# Patient Record
Sex: FEMALE | Race: WHITE | NOT HISPANIC OR LATINO | Employment: FULL TIME | URBAN - METROPOLITAN AREA
[De-identification: names, ages, dates, MRNs, and addresses within clinical notes are randomized per-mention and may not be internally consistent; named-entity substitution may affect disease eponyms.]

---
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
## 2019-04-26 ENCOUNTER — OFFICE VISIT (OUTPATIENT)
Dept: FAMILY MEDICINE | Facility: CLINIC | Age: 19
End: 2019-04-26
Payer: COMMERCIAL

## 2019-04-26 VITALS
HEART RATE: 88 BPM | TEMPERATURE: 98.2 F | DIASTOLIC BLOOD PRESSURE: 63 MMHG | RESPIRATION RATE: 16 BRPM | OXYGEN SATURATION: 98 % | BODY MASS INDEX: 30.02 KG/M2 | WEIGHT: 159 LBS | SYSTOLIC BLOOD PRESSURE: 125 MMHG | HEIGHT: 61 IN

## 2019-04-26 DIAGNOSIS — H69.91 DYSFUNCTION OF RIGHT EUSTACHIAN TUBE: Primary | ICD-10-CM

## 2019-04-26 DIAGNOSIS — L70.0 ACNE VULGARIS: ICD-10-CM

## 2019-04-26 PROCEDURE — 99213 OFFICE O/P EST LOW 20 MIN: CPT | Performed by: FAMILY MEDICINE

## 2019-04-26 RX ORDER — CEFUROXIME AXETIL 500 MG/1
500 TABLET ORAL 2 TIMES DAILY
Qty: 20 TABLET | Refills: 0 | Status: SHIPPED | OUTPATIENT
Start: 2019-04-26 | End: 2019-11-01

## 2019-04-26 ASSESSMENT — MIFFLIN-ST. JEOR: SCORE: 1428.6

## 2019-04-26 NOTE — PROGRESS NOTES
"SUBJECTIVE:  Sofy Stringer, a 19 year old female scheduled an appointment to discuss the following issues:     Acne vulgaris  Neoplasm of uncertain behavior of skin  right ear pain. No discharge. No swimming/ no airplance. Uses q-tips.   Pain past 2 weeks. No plugged. History sinus infections but doesn't feel like it. No fevers or chills.   No rhinorrhea/cough. left ear ok. History ALLERGIC RHINITIS - allegra.   Hearing a little down. Worse in AM. No jaw pain or teeth pain. Dentist  2 weeks ago.   omnicef ok in past.  Medical, social, surgical, and family histories reviewed.    ROS:  All other ROS negative.     OBJECTIVE:  /63   Pulse 88   Temp 98.2  F (36.8  C) (Oral)   Resp 16   Ht 1.549 m (5' 1\")   Wt 72.1 kg (159 lb)   SpO2 98%   BMI 30.04 kg/m    EXAM:  GENERAL APPEARANCE: healthy, alert and no distress  EYES: EOMI,  PERRL  HENT: ear canals and TM's right retracted with clear fliud behind tm and nose thick discharge and mouth without ulcers or lesions  NECK: no adenopathy, no asymmetry, masses, or scars and thyroid normal to palpation  RESP: lungs clear to auscultation - no rales, rhonchi or wheezes  CV: regular rates and rhythm, normal S1 S2, no S3 or S4 and no murmur, click or rub -  SKIN:mild acne on face  PSYCH: mentation appears normal and affect normal/bright    ASSESSMENT / PLAN:  (H69.81) Dysfunction of right eustachian tube  (primary encounter diagnosis)  Plan: cefuroxime (CEFTIN) 500 MG tablet        Continue allegra and add prn sudafed. Ear clearing exercises and consider flonase. ENT if worse/persists. Expected course and warning signs reviewed. Call/email with questions/concerns. Reveiwed risks and side effects of medication      (L70.0) Acne vulgaris  Comment: stable  Plan: benzoyl peroxide 5 % external liquid        Prn.     Tom Quarles MD          "

## 2019-04-26 NOTE — NURSING NOTE
"Chief Complaint   Patient presents with     Ear Problem     right        Initial /63   Pulse 88   Temp 98.2  F (36.8  C) (Oral)   Resp 16   Ht 1.549 m (5' 1\")   Wt 72.1 kg (159 lb)   SpO2 98%   BMI 30.04 kg/m   Estimated body mass index is 30.04 kg/m  as calculated from the following:    Height as of this encounter: 1.549 m (5' 1\").    Weight as of this encounter: 72.1 kg (159 lb).    Lakeisha Donnelly CMA    "

## 2019-09-23 DIAGNOSIS — L70.0 ACNE VULGARIS: ICD-10-CM

## 2019-11-01 ENCOUNTER — OFFICE VISIT (OUTPATIENT)
Dept: FAMILY MEDICINE | Facility: CLINIC | Age: 19
End: 2019-11-01
Payer: COMMERCIAL

## 2019-11-01 VITALS
BODY MASS INDEX: 28.32 KG/M2 | OXYGEN SATURATION: 100 % | WEIGHT: 150 LBS | TEMPERATURE: 98.5 F | HEART RATE: 68 BPM | SYSTOLIC BLOOD PRESSURE: 129 MMHG | HEIGHT: 61 IN | DIASTOLIC BLOOD PRESSURE: 88 MMHG

## 2019-11-01 DIAGNOSIS — J20.9 ACUTE BRONCHITIS WITH SYMPTOMS > 10 DAYS: Primary | ICD-10-CM

## 2019-11-01 DIAGNOSIS — N92.6 IRREGULAR MENSTRUAL CYCLE: ICD-10-CM

## 2019-11-01 PROCEDURE — 99214 OFFICE O/P EST MOD 30 MIN: CPT | Performed by: PHYSICIAN ASSISTANT

## 2019-11-01 RX ORDER — BENZONATATE 200 MG/1
200 CAPSULE ORAL 3 TIMES DAILY PRN
Qty: 30 CAPSULE | Refills: 0 | Status: SHIPPED | OUTPATIENT
Start: 2019-11-01 | End: 2019-11-11

## 2019-11-01 RX ORDER — NORETHINDRONE ACETATE AND ETHINYL ESTRADIOL 1MG-20(21)
1 KIT ORAL DAILY
Qty: 90 TABLET | Refills: 0 | Status: SHIPPED | OUTPATIENT
Start: 2019-11-01 | End: 2019-11-11 | Stop reason: ALTCHOICE

## 2019-11-01 RX ORDER — AZITHROMYCIN 250 MG/1
TABLET, FILM COATED ORAL
Qty: 6 TABLET | Refills: 0 | Status: SHIPPED | OUTPATIENT
Start: 2019-11-01 | End: 2019-11-11

## 2019-11-01 ASSESSMENT — ENCOUNTER SYMPTOMS
ABDOMINAL PAIN: 0
FREQUENCY: 0
COUGH: 1
SHORTNESS OF BREATH: 0
VOMITING: 0
FEVER: 0
RHINORRHEA: 1
DIARRHEA: 0
CHEST TIGHTNESS: 0
WHEEZING: 0
HEMATOCHEZIA: 0
SINUS PAIN: 1
CARDIOVASCULAR NEGATIVE: 1
GASTROINTESTINAL NEGATIVE: 1
NAUSEA: 0
SORE THROAT: 0
PALPITATIONS: 0
CONSTITUTIONAL NEGATIVE: 1
CHILLS: 0
HEMATURIA: 0
DYSURIA: 0
FLANK PAIN: 0
SINUS PRESSURE: 1
CONSTIPATION: 0
HEARTBURN: 0
FATIGUE: 0

## 2019-11-01 ASSESSMENT — MIFFLIN-ST. JEOR: SCORE: 1387.78

## 2019-11-01 NOTE — PROGRESS NOTES
Subjective   Sofy Stringer is a 19 year old female who presents to clinic today for the following health issues:  HPI   ENT Symptoms           Symptoms: cc Present Absent Comment   Fever/Chills   x    Fatigue   x    Muscle Aches   x    Eye Irritation   x    Sneezing  x     Nasal Jp/Drg  x     Sinus Pressure/Pain  x  Slight    Loss of smell  x     Dental pain   x    Sore Throat   x    Swollen Glands   x    Ear Pain/Fullness  x     Cough  x  Productive but no hemoptysis   Wheeze   x    Chest Pain   x    Shortness of breath   x    Rash   x    Other         Symptom duration:  2 weeks    Symptom severity:     Treatments tried:  dayquil and nightquil, rest    Contacts:  school         2)  Had recently restarted on her birth control pills, suha, 3weeks ago and her period has been continuous since then, non-stop.  No vaginal d/c, rashes and irritation.  She is on birth control to help regulate her menses.  She is not sexually active.  No previous hx of HTN, DVT, liver disease, cervical or uterine cancer.  Non-smoker.          Patient Active Problem List   Diagnosis     Obesity     Acne     Past Surgical History:   Procedure Laterality Date     TONSILLECTOMY & ADENOIDECTOMY  2002       Social History     Tobacco Use     Smoking status: Never Smoker     Smokeless tobacco: Never Used     Tobacco comment: Lives in smoke free household   Substance Use Topics     Alcohol use: No     Family History   Problem Relation Age of Onset     Thyroid Disease Father      Hypertension Maternal Grandmother      Lipids Maternal Grandmother      Skin Cancer Maternal Grandmother      Diabetes Maternal Grandmother      Hyperlipidemia Maternal Grandmother      Cancer - colorectal Maternal Grandfather      Cancer Maternal Grandfather      Colon Cancer Maternal Grandfather      Asthma Maternal Grandfather      Cerebrovascular Disease Other      Glaucoma No family hx of      Macular Degeneration No family hx of          Current Outpatient  "Medications   Medication Sig Dispense Refill     benzoyl peroxide 5 % external liquid Wash face daily 226 g 5     Bioflavonoid Products (VITAMIN C) CHEW Take 2 chew tab by mouth daily       Iron Combinations (IRON COMPLEX) CAPS Take 1 capsule by mouth daily       MULTI VIT/FL OR None Entered       Probiotic Product (PRO-BIOTIC BLEND) CAPS Take 1 capsule by mouth daily Reported on 5/11/2017       Allergies   Allergen Reactions     Penicillins Rash       Reviewed and updated as needed this visit by Provider       Review of Systems   Constitutional: Negative.  Negative for chills, fatigue and fever.   HENT: Positive for congestion, rhinorrhea, sinus pressure and sinus pain. Negative for ear discharge, ear pain, hearing loss and sore throat.    Respiratory: Positive for cough. Negative for chest tightness, shortness of breath and wheezing.    Cardiovascular: Negative.  Negative for chest pain, palpitations and peripheral edema.   Gastrointestinal: Negative.  Negative for abdominal pain, constipation, diarrhea, heartburn, hematochezia, nausea and vomiting.   Genitourinary: Positive for vaginal bleeding. Negative for dysuria, flank pain, frequency, hematuria, pelvic pain, urgency and vaginal discharge.   All other systems reviewed and are negative.         Objective    /88   Pulse 68   Temp 98.5  F (36.9  C) (Oral)   Ht 1.549 m (5' 1\")   Wt 68 kg (150 lb)   SpO2 100%   BMI 28.34 kg/m    Body mass index is 28.34 kg/m .  Physical Exam  Vitals signs and nursing note reviewed.   Constitutional:       General: She is not in acute distress.     Appearance: Normal appearance. She is well-developed and normal weight. She is not ill-appearing.   HENT:      Head: Normocephalic and atraumatic.      Comments: TMs are intact without any erythema or bulging bilaterally.  Airway is patent.     Nose: Nose normal.      Mouth/Throat:      Mouth: Mucous membranes are moist.      Pharynx: Uvula midline. No pharyngeal swelling, " oropharyngeal exudate or posterior oropharyngeal erythema.      Tonsils: No tonsillar exudate.   Eyes:      General: No scleral icterus.     Extraocular Movements: Extraocular movements intact.      Conjunctiva/sclera: Conjunctivae normal.      Pupils: Pupils are equal, round, and reactive to light.   Neck:      Musculoskeletal: Normal range of motion and neck supple.      Thyroid: No thyromegaly.   Cardiovascular:      Rate and Rhythm: Normal rate and regular rhythm.      Pulses: Normal pulses.      Heart sounds: Normal heart sounds, S1 normal and S2 normal. No murmur. No friction rub. No gallop.    Pulmonary:      Effort: Pulmonary effort is normal. No accessory muscle usage, respiratory distress or retractions.      Breath sounds: Normal breath sounds and air entry. No stridor. No decreased breath sounds, wheezing, rhonchi or rales.   Lymphadenopathy:      Cervical: No cervical adenopathy.   Skin:     General: Skin is warm and dry.      Nails: There is no clubbing.     Neurological:      Mental Status: She is alert and oriented to person, place, and time.   Psychiatric:         Mood and Affect: Mood normal.         Behavior: Behavior normal.         Thought Content: Thought content normal.         Judgement: Judgment normal.              Assessment & Plan   Acute bronchitis with symptoms > 10 days:  Will treat with zithromax X5days and tessalon perles as needed for cough  Recommend treatment with rest, fluids and chicken soup. Tylenol/ibuprofen prn fever/pain.  Recheck in clinic if symptoms worsen or if symptoms do not improve.  To the ER if he develops hemoptysis, chest pain, fevers>102, worsening shortness of breath/wheezing.    -     azithromycin (ZITHROMAX) 250 MG tablet; 2 tablets the first day, then 1 tablet daily for the next 4 days  -     benzonatate (TESSALON) 200 MG capsule; Take 1 capsule (200 mg) by mouth 3 times daily as needed for cough    Irregular menstrual cycle:  Most likely due to restarting  her birth control pills.  Will switch birth control pills to low estrogen, low progesterone dose.  Discussed risks and benefits of medication along with side effects, direction for use/missed pills, and backup method.  Recheck in clinic if symptoms worsen or if symptoms do not improve.  -     norethindrone-ethinyl estradiol (LOESTRIN FE 1/20) 1-20 MG-MCG tablet; Take 1 tablet by mouth daily           Nadya See EDILIA George  [facility]

## 2019-11-07 ENCOUNTER — HEALTH MAINTENANCE LETTER (OUTPATIENT)
Age: 19
End: 2019-11-07

## 2019-11-11 ENCOUNTER — OFFICE VISIT (OUTPATIENT)
Dept: OBGYN | Facility: CLINIC | Age: 19
End: 2019-11-11
Payer: COMMERCIAL

## 2019-11-11 VITALS
HEART RATE: 93 BPM | WEIGHT: 151 LBS | SYSTOLIC BLOOD PRESSURE: 132 MMHG | OXYGEN SATURATION: 99 % | BODY MASS INDEX: 28.51 KG/M2 | TEMPERATURE: 98 F | HEIGHT: 61 IN | DIASTOLIC BLOOD PRESSURE: 83 MMHG

## 2019-11-11 DIAGNOSIS — N92.6 IRREGULAR MENSTRUAL CYCLE: Primary | ICD-10-CM

## 2019-11-11 PROCEDURE — 99203 OFFICE O/P NEW LOW 30 MIN: CPT | Performed by: NURSE PRACTITIONER

## 2019-11-11 RX ORDER — NORGESTIMATE AND ETHINYL ESTRADIOL 0.25-0.035
1 KIT ORAL DAILY
Qty: 28 TABLET | Refills: 5 | Status: SHIPPED | OUTPATIENT
Start: 2019-11-11 | End: 2020-07-14

## 2019-11-11 ASSESSMENT — PAIN SCALES - GENERAL: PAINLEVEL: NO PAIN (0)

## 2019-11-11 ASSESSMENT — MIFFLIN-ST. JEOR: SCORE: 1384.37

## 2019-11-11 NOTE — PROGRESS NOTES
Subjective     Sofy Stringer is a 19 year old female who presents to clinic today for the following health issues:    HPI   Follow up Irregular menstrual cycle    Patient has a history of unpredictable menstrual cycles. When they occur, can be heavy and painful. In the past, has used combined oral contraceptive pills for cycle control ahead of travel. Patient leaving in January for a cruise and started her pills in October. Last, year, was started on Ree, but did not like how it only had 4 placebo days and felt it was shutting down her flow too early, then changed to Flor and did well on it.  Started Flor in October, had bleeding through the entire first 3 weeks of active pills. Was seen in same day visit for respiratory issues 11/1/19 (end of week 3) and mentioned the bleeding, switched to LoEstrin Fe 1/20. Did not take any pills 11/2/19 and then started new pills 11/3/19 and bleeding has become heavier. Never been sexually active, no missed or late pills. Denies fatigue, dizziness.     Patient Active Problem List   Diagnosis     Obesity     Acne     Past Surgical History:   Procedure Laterality Date     TONSILLECTOMY & ADENOIDECTOMY  2002       Social History     Tobacco Use     Smoking status: Never Smoker     Smokeless tobacco: Never Used     Tobacco comment: Lives in smoke free household   Substance Use Topics     Alcohol use: No     Family History   Problem Relation Age of Onset     Thyroid Disease Father      Hypertension Maternal Grandmother      Lipids Maternal Grandmother      Skin Cancer Maternal Grandmother      Diabetes Maternal Grandmother      Hyperlipidemia Maternal Grandmother      Cancer - colorectal Maternal Grandfather      Cancer Maternal Grandfather      Colon Cancer Maternal Grandfather      Asthma Maternal Grandfather      Cerebrovascular Disease Other      Glaucoma No family hx of      Macular Degeneration No family hx of            Reviewed and updated as needed this visit by  "Provider         Review of Systems   ROS COMP: Constitutional, HEENT, cardiovascular, pulmonary, gi and gu systems are negative, except as otherwise noted.      Objective    /83 (BP Location: Right arm, Patient Position: Sitting, Cuff Size: Adult Regular)   Pulse 93   Temp 98  F (36.7  C) (Oral)   Ht 1.537 m (5' 0.5\")   Wt 68.5 kg (151 lb)   SpO2 99%   BMI 29.00 kg/m    Body mass index is 29 kg/m .  Physical Exam   GENERAL: healthy, alert and no distress  MS: no gross musculoskeletal defects noted, no edema  SKIN: no suspicious lesions or rashes  NEURO: Normal strength and tone, mentation intact and speech normal  PSYCH: mentation appears normal, affect normal/bright    Assessment & Plan     1. Irregular menstrual cycle  We discussed her irregular cycles. She prefers not to remain on anything long term, only planning on staying on medication until after her cruise.  Discussed that irregular bleeding when first starting pills can be normal and possible that not taking a break when changing pills has caused it to continue. Patient not liking how she feels on the new pills, wants to try alternate.  Patient has taken a dose today. Will stop as of tomorrow, start new pills Sunday. Discussed she may have spotting to light flow the first pack, should resolve by second pack-if it does not, she should contact me. Patient is given an opportunity to ask questions and have them answered. Discussed initial side effects. Prescription sent with a few extra refills.  - norgestimate-ethinyl estradiol (ORTHO-CYCLEN/SPRINTEC) 0.25-35 MG-MCG tablet; Take 1 tablet by mouth daily  Dispense: 28 tablet; Refill: 5     BEN Carrasco [facility]      "

## 2019-11-12 ENCOUNTER — MYC MEDICAL ADVICE (OUTPATIENT)
Dept: FAMILY MEDICINE | Facility: CLINIC | Age: 19
End: 2019-11-12

## 2020-07-07 NOTE — PROGRESS NOTES
SUBJECTIVE:   CC: Sofy Stringer is an 20 year old woman who presents for preventive health visit.     Healthy Habits:    Forms for school, pt going to college. Needing labs and updated immunizations.    PAP is due per pt.      Do you get at least three servings of calcium containing foods daily (dairy, green leafy vegetables, etc.)? yes    Amount of exercise or daily activities, outside of work: 1 day(s) per week    Problems taking medications regularly No    Medication side effects: No    Have you had an eye exam in the past two years? yes    Do you see a dentist twice per year? yes    Do you have sleep apnea, excessive snoring or daytime drowsiness?no    Periods--heavy. Usually regular. H/o anemia will recheck. trouble taking iron tablets.     Pap-declined for now. Not sexually active never has been. Low risk. Will come back when comfortable per patient.     No drugs or alcohol abuse.      Is doing weight watchers to trying and lose weight. Obese.     Plans on doing kickboxing class.     Has nursing titers and forms today. Starts in fall.       Today's PHQ-2 Score:   PHQ-2 ( 1999 Pfizer) 7/14/2020 4/26/2019   Q1: Little interest or pleasure in doing things 0 0   Q2: Feeling down, depressed or hopeless 0 0   PHQ-2 Score 0 0       Abuse: Current or Past(Physical, Sexual or Emotional)- No  Do you feel safe in your environment? Yes        Social History     Tobacco Use     Smoking status: Never Smoker     Smokeless tobacco: Never Used     Tobacco comment: Lives in smoke free household   Substance Use Topics     Alcohol use: No     If you drink alcohol do you typically have >3 drinks per day or >7 drinks per week? No                     Reviewed orders with patient.  Reviewed health maintenance and updated orders accordingly - Yes  Lab work is in process  Labs reviewed in EPIC  BP Readings from Last 3 Encounters:   07/14/20 110/66   11/11/19 132/83   11/01/19 129/88    Wt Readings from Last 3 Encounters:    07/14/20 73 kg (161 lb)   11/11/19 68.5 kg (151 lb)   11/01/19 68 kg (150 lb)                  Patient Active Problem List   Diagnosis     Obesity     Acne     Past Surgical History:   Procedure Laterality Date     TONSILLECTOMY & ADENOIDECTOMY  2002       Social History     Tobacco Use     Smoking status: Never Smoker     Smokeless tobacco: Never Used     Tobacco comment: Lives in smoke free household   Substance Use Topics     Alcohol use: No     Family History   Problem Relation Age of Onset     Thyroid Disease Father      Hypertension Maternal Grandmother      Lipids Maternal Grandmother      Skin Cancer Maternal Grandmother      Diabetes Maternal Grandmother      Hyperlipidemia Maternal Grandmother      Cancer - colorectal Maternal Grandfather      Cancer Maternal Grandfather      Colon Cancer Maternal Grandfather      Asthma Maternal Grandfather      Cerebrovascular Disease Other      Glaucoma No family hx of      Macular Degeneration No family hx of          Current Outpatient Medications   Medication Sig Dispense Refill     benzoyl peroxide (BENZOYL PEROXIDE WASH) 5 % external liquid WASH FACE DAILY 227 g 5     Bioflavonoid Products (VITAMIN C) CHEW Take 2 chew tab by mouth daily       Iron Combinations (IRON COMPLEX) CAPS Take 1 capsule by mouth daily       MULTI VIT/FL OR None Entered       Probiotic Product (PRO-BIOTIC BLEND) CAPS Take 1 capsule by mouth daily Reported on 5/11/2017       Allergies   Allergen Reactions     Penicillins Rash       Mammogram not appropriate for this patient based on age.    Pertinent mammograms are reviewed under the imaging tab.  History of abnormal Pap smear: NO - age 21-29 PAP every 3 years recommended     Reviewed and updated as needed this visit by clinical staff  Tobacco  Allergies  Meds  Med Hx  Surg Hx  Fam Hx  Soc Hx        Reviewed and updated as needed this visit by Provider        History reviewed. No pertinent past medical history.   Past Surgical  "History:   Procedure Laterality Date     TONSILLECTOMY & ADENOIDECTOMY  2002     OB History    Para Term  AB Living   0 0 0 0 0 0   SAB TAB Ectopic Multiple Live Births   0 0 0 0 0       ROS:  CONSTITUTIONAL: NEGATIVE for fever, chills, change in weight  INTEGUMENTARU/SKIN: NEGATIVE for worrisome rashes, moles or lesions  EYES: NEGATIVE for vision changes or irritation  ENT: NEGATIVE for ear, mouth and throat problems  RESP: NEGATIVE for significant cough or SOB  BREAST: NEGATIVE for masses, tenderness or discharge  CV: NEGATIVE for chest pain, palpitations or peripheral edema  GI: NEGATIVE for nausea, abdominal pain, heartburn, or change in bowel habits  : NEGATIVE for unusual urinary or vaginal symptoms. Periods are regular.  MUSCULOSKELETAL: NEGATIVE for significant arthralgias or myalgia  NEURO: NEGATIVE for weakness, dizziness or paresthesias  PSYCHIATRIC: NEGATIVE for changes in mood or affect    OBJECTIVE:   /66   Pulse 79   Temp 98  F (36.7  C) (Tympanic)   Resp 14   Ht 1.537 m (5' 0.5\")   Wt 73 kg (161 lb)   LMP 2020 (Approximate)   SpO2 98%   Breastfeeding No   BMI 30.93 kg/m    EXAM:  GENERAL: alert, no distress and obese  EYES: Eyes grossly normal to inspection, PERRL and conjunctivae and sclerae normal  HENT: ear canals and TM's normal, nose and mouth without ulcers or lesions  NECK: no adenopathy, no asymmetry, masses, or scars and thyroid normal to palpation  RESP: lungs clear to auscultation - no rales, rhonchi or wheezes  BREAST: normal without masses, tenderness or nipple discharge and no palpable axillary masses or adenopathy  CV: regular rate and rhythm, normal S1 S2, no S3 or S4, no murmur, click or rub, no peripheral edema and peripheral pulses strong  ABDOMEN: soft, nontender, no hepatosplenomegaly, no masses and bowel sounds normal  MS: no gross musculoskeletal defects noted, no edema  SKIN: no suspicious lesions or rashes  NEURO: Normal strength and tone, " "mentation intact and speech normal  PSYCH: mentation appears normal, affect normal/bright    Diagnostic Test Results:  Labs reviewed in Epic    ASSESSMENT/PLAN:   1. Routine general medical examination at a health care facility    - Hepatitis B Surface Antibody  - Rubella Antibody IgG Quantitative  - Rubeola Antibody IgG  - Mumps Immune Status, IgG  - Varicella Zoster Virus Antibody IgG  - Quantiferon TB Gold Plus          4. Screening for diabetes mellitus    - Hemoglobin A1c    5. Anemia, unspecified type    - Hemoglobin  - Ferritin    COUNSELING:   Reviewed preventive health counseling, as reflected in patient instructions       Regular exercise       Healthy diet/nutrition       Vision screening       Hearing screening    Estimated body mass index is 30.93 kg/m  as calculated from the following:    Height as of this encounter: 1.537 m (5' 0.5\").    Weight as of this encounter: 73 kg (161 lb).    Weight management plan: Discussed healthy diet and exercise guidelines     reports that she has never smoked. She has never used smokeless tobacco.     Patient Instructions   Lifestyle recommendations:  Being overweight or obese puts you are risk of major health problems including but not limited to: heart disease/heart attack, stroke, high cholesterol, high blood pressure, and diabetes.  This is why it is important to be at a healthy weight for your height.     Exercise 30 minutes 3-5 times a week, if you can only do 10 minutes 3 times a week that is still shown to have great benefit!  Brisk walking even counts for this.  Consider free youtube videos for exercise that fits your needs and lifestyle.     Monitor your caffeine and soda intake, try to minimize these beverages    Drink plenty of water (about 70-80 ounces a day)    Try to eat a vegetable and fruit  with lunch and dinner.  Have a breakfast that contains protein such as eggs or oatmeal.  Decrease your white bread, pasta, and sweets intake.  Increase lean " proteins like chicken or pork. Try to eat out 1-2 times a week or less.  Monitor your portion sizes, try using smaller plates if needed.  Eat slowly, this gives you time to be aware that your body is full.   Let me know at any time if you would like a referral to a nutritionist!        Preventive Health Recommendations  Female Ages 21 to 25     Yearly exam:     See your health care provider every year in order to  o Review health changes.   o Discuss preventive care.    o Review your medicines if your doctor has prescribed any.      You should be tested each year for STDs (sexually transmitted diseases).       Talk to your provider about how often you should have cholesterol testing.      Get a Pap test every three years. If you have an abnormal result, your doctor may have you test more often.      If you are at risk for diabetes, you should have a diabetes test (fasting glucose).     Shots:     Get a flu shot each year.     Get a tetanus shot every 10 years.     Consider getting the shot (vaccine) that prevents cervical cancer (Gardasil).    Nutrition:     Eat at least 5 servings of fruits and vegetables each day.    Eat whole-grain bread, whole-wheat pasta and brown rice instead of white grains and rice.    Get adequate Calcium and Vitamin D.     Lifestyle    Exercise at least 150 minutes a week each week (30 minutes a day, 5 days a week). This will help you control your weight and prevent disease.    Limit alcohol to one drink per day.    No smoking.     Wear sunscreen to prevent skin cancer.    See your dentist every six months for an exam and cleaning.          Counseling Resources:  ATP IV Guidelines  Pooled Cohorts Equation Calculator  Breast Cancer Risk Calculator  FRAX Risk Assessment  ICSI Preventive Guidelines  Dietary Guidelines for Americans, 2010  USDA's MyPlate  ASA Prophylaxis  Lung CA Screening    Dary Pepper PA-C  [facility]

## 2020-07-14 ENCOUNTER — OFFICE VISIT (OUTPATIENT)
Dept: FAMILY MEDICINE | Facility: CLINIC | Age: 20
End: 2020-07-14
Payer: COMMERCIAL

## 2020-07-14 VITALS
HEART RATE: 79 BPM | OXYGEN SATURATION: 98 % | DIASTOLIC BLOOD PRESSURE: 66 MMHG | TEMPERATURE: 98 F | RESPIRATION RATE: 14 BRPM | HEIGHT: 61 IN | BODY MASS INDEX: 30.4 KG/M2 | SYSTOLIC BLOOD PRESSURE: 110 MMHG | WEIGHT: 161 LBS

## 2020-07-14 DIAGNOSIS — Z00.00 ROUTINE GENERAL MEDICAL EXAMINATION AT A HEALTH CARE FACILITY: ICD-10-CM

## 2020-07-14 DIAGNOSIS — D64.9 ANEMIA, UNSPECIFIED TYPE: ICD-10-CM

## 2020-07-14 DIAGNOSIS — Z13.1 SCREENING FOR DIABETES MELLITUS: Primary | ICD-10-CM

## 2020-07-14 LAB
HBA1C MFR BLD: 5.2 % (ref 0–5.6)
HGB BLD-MCNC: 11.9 G/DL (ref 11.7–15.7)

## 2020-07-14 PROCEDURE — 90715 TDAP VACCINE 7 YRS/> IM: CPT | Performed by: PHYSICIAN ASSISTANT

## 2020-07-14 PROCEDURE — 82728 ASSAY OF FERRITIN: CPT | Performed by: PHYSICIAN ASSISTANT

## 2020-07-14 PROCEDURE — 86765 RUBEOLA ANTIBODY: CPT | Performed by: PHYSICIAN ASSISTANT

## 2020-07-14 PROCEDURE — 90471 IMMUNIZATION ADMIN: CPT | Performed by: PHYSICIAN ASSISTANT

## 2020-07-14 PROCEDURE — 85018 HEMOGLOBIN: CPT | Performed by: PHYSICIAN ASSISTANT

## 2020-07-14 PROCEDURE — 86481 TB AG RESPONSE T-CELL SUSP: CPT | Performed by: PHYSICIAN ASSISTANT

## 2020-07-14 PROCEDURE — 86787 VARICELLA-ZOSTER ANTIBODY: CPT | Performed by: PHYSICIAN ASSISTANT

## 2020-07-14 PROCEDURE — 83036 HEMOGLOBIN GLYCOSYLATED A1C: CPT | Performed by: PHYSICIAN ASSISTANT

## 2020-07-14 PROCEDURE — 86706 HEP B SURFACE ANTIBODY: CPT | Performed by: PHYSICIAN ASSISTANT

## 2020-07-14 PROCEDURE — 86735 MUMPS ANTIBODY: CPT | Performed by: PHYSICIAN ASSISTANT

## 2020-07-14 PROCEDURE — 99395 PREV VISIT EST AGE 18-39: CPT | Mod: 25 | Performed by: PHYSICIAN ASSISTANT

## 2020-07-14 PROCEDURE — 36415 COLL VENOUS BLD VENIPUNCTURE: CPT | Performed by: PHYSICIAN ASSISTANT

## 2020-07-14 PROCEDURE — 86762 RUBELLA ANTIBODY: CPT | Performed by: PHYSICIAN ASSISTANT

## 2020-07-14 ASSESSMENT — MIFFLIN-ST. JEOR: SCORE: 1424.73

## 2020-07-15 LAB — FERRITIN SERPL-MCNC: 12 NG/ML (ref 12–150)

## 2020-07-16 ENCOUNTER — TELEPHONE (OUTPATIENT)
Dept: FAMILY MEDICINE | Facility: CLINIC | Age: 20
End: 2020-07-16

## 2020-07-16 LAB
GAMMA INTERFERON BACKGROUND BLD IA-ACNC: 0 IU/ML
HBV SURFACE AB SERPL IA-ACNC: 1.36 M[IU]/ML
M TB IFN-G CD4+ BCKGRND COR BLD-ACNC: 10 IU/ML
M TB TUBERC IFN-G BLD QL: NEGATIVE
MEV IGG SER QL IA: 2.9 AI (ref 0–0.8)
MITOGEN IGNF BCKGRD COR BLD-ACNC: 0 IU/ML
MITOGEN IGNF BCKGRD COR BLD-ACNC: 0 IU/ML
MUV IGG SER QL IA: 2.1 AI (ref 0–0.8)
RUBV IGG SERPL IA-ACNC: 53 IU/ML
VZV IGG SER QL IA: 3.3 AI (ref 0–0.8)

## 2020-07-16 NOTE — TELEPHONE ENCOUNTER
PLEASE CALL PATIENT:   Dear Sofy,       It was a pleasure to see you at your recent office visit.  Your test results are listed below.  Tb test negative. You are immune to varicella (chicken pox). You are immune to rubella, rubeola (mealses), and mumps.  You are not immune to Hep B so you will need this vaccine booster please schedule patient for this. Ferritin on the low end (iron storage level) but no anemia. a1c shows no diabetes.         If you have any questions or concerns, please call the clinic at [phone].     Sincerely,   Dary Pepper PA-C

## 2020-07-16 NOTE — RESULT ENCOUNTER NOTE
PLEASE CALL PATIENT:  Dear Sofy,      It was a pleasure to see you at your recent office visit.  Your test results are listed below.  Tb test negative. You are immune to varicella (chicken pox). You are immune to rubella, rubeola (mealses), and mumps.  You are not immune to Hep B so you will need this vaccine booster please schedule patient for this. Ferritin on the low end (iron storage level) but no anemia. a1c shows no diabetes.         If you have any questions or concerns, please call the clinic at [phone].    Sincerely,  Dary Pepper PA-C

## 2020-07-16 NOTE — TELEPHONE ENCOUNTER
Pt notified of provider message as written.  Pt verbalized good understanding.  Lakeisha Hernandez BSN, RN

## 2020-07-28 ENCOUNTER — ALLIED HEALTH/NURSE VISIT (OUTPATIENT)
Dept: NURSING | Facility: CLINIC | Age: 20
End: 2020-07-28
Payer: COMMERCIAL

## 2020-07-28 DIAGNOSIS — Z23 NEED FOR HEPATITIS B BOOSTER VACCINATION: Primary | ICD-10-CM

## 2020-07-28 PROCEDURE — 90471 IMMUNIZATION ADMIN: CPT

## 2020-07-28 PROCEDURE — 90746 HEPB VACCINE 3 DOSE ADULT IM: CPT

## 2020-07-28 NOTE — NURSING NOTE
Prior to immunization administration, verified patients identity using patient s name and date of birth. Please see Immunization Activity for additional information.     Screening Questionnaire for Adult Immunization    Are you sick today?   No   Do you have allergies to medications, food, a vaccine component or latex?   Yes   Have you ever had a serious reaction after receiving a vaccination?   No   Do you have a long-term health problem with heart, lung, kidney, or metabolic disease (e.g., diabetes), asthma, a blood disorder, no spleen, complement component deficiency, a cochlear implant, or a spinal fluid leak?  Are you on long-term aspirin therapy?   No   Do you have cancer, leukemia, HIV/AIDS, or any other immune system problem?   No   Do you have a parent, brother, or sister with an immune system problem?   No   In the past 3 months, have you taken medications that affect  your immune system, such as prednisone, other steroids, or anticancer drugs; drugs for the treatment of rheumatoid arthritis, Crohn s disease, or psoriasis; or have you had radiation treatments?   No   Have you had a seizure, or a brain or other nervous system problem?   No   During the past year, have you received a transfusion of blood or blood    products, or been given immune (gamma) globulin or antiviral drug?   No   For women: Are you pregnant or is there a chance you could become       pregnant during the next month?   No   Have you received any vaccinations in the past 4 weeks?   Yes      Immunization questionnaire was positive for at least one answer.  Notified Dary Pepper.             Screening performed by Coco Chahal MA on 7/28/2020 at 1:39 PM.

## 2020-08-13 ENCOUNTER — MYC MEDICAL ADVICE (OUTPATIENT)
Dept: FAMILY MEDICINE | Facility: CLINIC | Age: 20
End: 2020-08-13

## 2020-08-13 DIAGNOSIS — Z23 ENCOUNTER FOR IMMUNIZATION: Primary | ICD-10-CM

## 2020-08-22 ENCOUNTER — APPOINTMENT (OUTPATIENT)
Dept: CT IMAGING | Facility: CLINIC | Age: 20
End: 2020-08-22
Attending: NURSE PRACTITIONER
Payer: COMMERCIAL

## 2020-08-22 ENCOUNTER — HOSPITAL ENCOUNTER (EMERGENCY)
Facility: CLINIC | Age: 20
Discharge: HOME | End: 2020-08-22
Attending: EMERGENCY MEDICINE | Admitting: EMERGENCY MEDICINE
Payer: COMMERCIAL

## 2020-08-22 ENCOUNTER — APPOINTMENT (OUTPATIENT)
Dept: ULTRASOUND IMAGING | Facility: CLINIC | Age: 20
End: 2020-08-22
Attending: EMERGENCY MEDICINE
Payer: COMMERCIAL

## 2020-08-22 VITALS
OXYGEN SATURATION: 99 % | SYSTOLIC BLOOD PRESSURE: 117 MMHG | HEART RATE: 72 BPM | TEMPERATURE: 98.5 F | WEIGHT: 160 LBS | DIASTOLIC BLOOD PRESSURE: 80 MMHG | BODY MASS INDEX: 30.73 KG/M2 | RESPIRATION RATE: 16 BRPM

## 2020-08-22 DIAGNOSIS — K63.89 EPIPLOIC APPENDAGITIS: ICD-10-CM

## 2020-08-22 LAB
ALBUMIN SERPL-MCNC: 3.8 G/DL (ref 3.4–5)
ALBUMIN UR-MCNC: NEGATIVE MG/DL
ALP SERPL-CCNC: 79 U/L (ref 40–150)
ALT SERPL W P-5'-P-CCNC: 23 U/L (ref 0–50)
ANION GAP SERPL CALCULATED.3IONS-SCNC: 5 MMOL/L (ref 3–14)
APPEARANCE UR: ABNORMAL
AST SERPL W P-5'-P-CCNC: 13 U/L (ref 0–45)
BACTERIA #/AREA URNS HPF: ABNORMAL /HPF
BASOPHILS # BLD AUTO: 0 10E9/L (ref 0–0.2)
BASOPHILS NFR BLD AUTO: 0.5 %
BILIRUB SERPL-MCNC: 0.4 MG/DL (ref 0.2–1.3)
BILIRUB UR QL STRIP: NEGATIVE
BUN SERPL-MCNC: 12 MG/DL (ref 7–30)
CALCIUM SERPL-MCNC: 8.8 MG/DL (ref 8.5–10.1)
CHLORIDE SERPL-SCNC: 107 MMOL/L (ref 94–109)
CO2 SERPL-SCNC: 26 MMOL/L (ref 20–32)
COLOR UR AUTO: YELLOW
CREAT SERPL-MCNC: 0.52 MG/DL (ref 0.52–1.04)
DIFFERENTIAL METHOD BLD: NORMAL
EOSINOPHIL # BLD AUTO: 0.2 10E9/L (ref 0–0.7)
EOSINOPHIL NFR BLD AUTO: 3.6 %
ERYTHROCYTE [DISTWIDTH] IN BLOOD BY AUTOMATED COUNT: 13.1 % (ref 10–15)
GFR SERPL CREATININE-BSD FRML MDRD: >90 ML/MIN/{1.73_M2}
GLUCOSE SERPL-MCNC: 85 MG/DL (ref 70–99)
GLUCOSE UR STRIP-MCNC: NEGATIVE MG/DL
HCG UR QL: NEGATIVE
HCT VFR BLD AUTO: 38.6 % (ref 35–47)
HGB BLD-MCNC: 12.2 G/DL (ref 11.7–15.7)
HGB UR QL STRIP: NEGATIVE
IMM GRANULOCYTES # BLD: 0 10E9/L (ref 0–0.4)
IMM GRANULOCYTES NFR BLD: 0.2 %
KETONES UR STRIP-MCNC: NEGATIVE MG/DL
LEUKOCYTE ESTERASE UR QL STRIP: NEGATIVE
LYMPHOCYTES # BLD AUTO: 2.6 10E9/L (ref 0.8–5.3)
LYMPHOCYTES NFR BLD AUTO: 44.3 %
MCH RBC QN AUTO: 27.9 PG (ref 26.5–33)
MCHC RBC AUTO-ENTMCNC: 31.6 G/DL (ref 31.5–36.5)
MCV RBC AUTO: 88 FL (ref 78–100)
MONOCYTES # BLD AUTO: 0.3 10E9/L (ref 0–1.3)
MONOCYTES NFR BLD AUTO: 5.7 %
MUCOUS THREADS #/AREA URNS LPF: PRESENT /LPF
NEUTROPHILS # BLD AUTO: 2.7 10E9/L (ref 1.6–8.3)
NEUTROPHILS NFR BLD AUTO: 45.7 %
NITRATE UR QL: NEGATIVE
NRBC # BLD AUTO: 0 10*3/UL
NRBC BLD AUTO-RTO: 0 /100
PH UR STRIP: 6 PH (ref 5–7)
PLATELET # BLD AUTO: 331 10E9/L (ref 150–450)
POTASSIUM SERPL-SCNC: 3.8 MMOL/L (ref 3.4–5.3)
PROT SERPL-MCNC: 7.7 G/DL (ref 6.8–8.8)
RBC # BLD AUTO: 4.37 10E12/L (ref 3.8–5.2)
RBC #/AREA URNS AUTO: 2 /HPF (ref 0–2)
SODIUM SERPL-SCNC: 138 MMOL/L (ref 133–144)
SOURCE: ABNORMAL
SP GR UR STRIP: 1.02 (ref 1–1.03)
SQUAMOUS #/AREA URNS AUTO: 4 /HPF (ref 0–1)
UROBILINOGEN UR STRIP-MCNC: 0 MG/DL (ref 0–2)
WBC # BLD AUTO: 5.8 10E9/L (ref 4–11)
WBC #/AREA URNS AUTO: 2 /HPF (ref 0–5)

## 2020-08-22 PROCEDURE — 96375 TX/PRO/DX INJ NEW DRUG ADDON: CPT | Performed by: EMERGENCY MEDICINE

## 2020-08-22 PROCEDURE — 99285 EMERGENCY DEPT VISIT HI MDM: CPT | Mod: 25 | Performed by: EMERGENCY MEDICINE

## 2020-08-22 PROCEDURE — 96361 HYDRATE IV INFUSION ADD-ON: CPT | Performed by: EMERGENCY MEDICINE

## 2020-08-22 PROCEDURE — 96374 THER/PROPH/DIAG INJ IV PUSH: CPT | Mod: 59 | Performed by: EMERGENCY MEDICINE

## 2020-08-22 PROCEDURE — 85025 COMPLETE CBC W/AUTO DIFF WBC: CPT | Performed by: EMERGENCY MEDICINE

## 2020-08-22 PROCEDURE — 25000128 H RX IP 250 OP 636: Performed by: EMERGENCY MEDICINE

## 2020-08-22 PROCEDURE — 99285 EMERGENCY DEPT VISIT HI MDM: CPT | Mod: Z6 | Performed by: EMERGENCY MEDICINE

## 2020-08-22 PROCEDURE — 80053 COMPREHEN METABOLIC PANEL: CPT | Performed by: EMERGENCY MEDICINE

## 2020-08-22 PROCEDURE — 25800030 ZZH RX IP 258 OP 636: Performed by: EMERGENCY MEDICINE

## 2020-08-22 PROCEDURE — 25000128 H RX IP 250 OP 636: Performed by: NURSE PRACTITIONER

## 2020-08-22 PROCEDURE — 81001 URINALYSIS AUTO W/SCOPE: CPT | Performed by: EMERGENCY MEDICINE

## 2020-08-22 PROCEDURE — 93976 VASCULAR STUDY: CPT

## 2020-08-22 PROCEDURE — 81025 URINE PREGNANCY TEST: CPT | Performed by: NURSE PRACTITIONER

## 2020-08-22 PROCEDURE — 74177 CT ABD & PELVIS W/CONTRAST: CPT

## 2020-08-22 RX ORDER — ONDANSETRON 2 MG/ML
4 INJECTION INTRAMUSCULAR; INTRAVENOUS ONCE
Status: COMPLETED | OUTPATIENT
Start: 2020-08-22 | End: 2020-08-22

## 2020-08-22 RX ORDER — KETOROLAC TROMETHAMINE 15 MG/ML
15 INJECTION, SOLUTION INTRAMUSCULAR; INTRAVENOUS ONCE
Status: COMPLETED | OUTPATIENT
Start: 2020-08-22 | End: 2020-08-22

## 2020-08-22 RX ORDER — ONDANSETRON 4 MG/1
4 TABLET, FILM COATED ORAL EVERY 8 HOURS PRN
Qty: 6 TABLET | Refills: 0 | Status: SHIPPED | OUTPATIENT
Start: 2020-08-22 | End: 2022-09-28

## 2020-08-22 RX ORDER — IOPAMIDOL 755 MG/ML
84 INJECTION, SOLUTION INTRAVASCULAR ONCE
Status: COMPLETED | OUTPATIENT
Start: 2020-08-22 | End: 2020-08-22

## 2020-08-22 RX ORDER — OXYCODONE HYDROCHLORIDE 5 MG/1
5 TABLET ORAL EVERY 6 HOURS PRN
Qty: 8 TABLET | Refills: 0 | Status: SHIPPED | OUTPATIENT
Start: 2020-08-22 | End: 2021-06-18

## 2020-08-22 RX ADMIN — IOPAMIDOL 84 ML: 755 INJECTION, SOLUTION INTRAVENOUS at 16:21

## 2020-08-22 RX ADMIN — KETOROLAC TROMETHAMINE 15 MG: 15 INJECTION, SOLUTION INTRAMUSCULAR; INTRAVENOUS at 14:25

## 2020-08-22 RX ADMIN — ONDANSETRON 4 MG: 2 INJECTION INTRAMUSCULAR; INTRAVENOUS at 14:24

## 2020-08-22 RX ADMIN — SODIUM CHLORIDE 1000 ML: 9 INJECTION, SOLUTION INTRAVENOUS at 14:24

## 2020-08-22 NOTE — ED AVS SNAPSHOT
[facility] Emergency Department  [address] [address]  Phone:  [phone]  Fax:  [phone]                                    Sofy Stringer   MRN: [id number]    Department:  [facility] Emergency Department   Date of Visit:  8/22/2020           After Visit Summary Signature Page    I have received my discharge instructions, and my questions have been answered. I have discussed any challenges I see with this plan with the nurse or doctor.    ..........................................................................................................................................  Patient/Patient Representative Signature      ..........................................................................................................................................  Patient Representative Print Name and Relationship to Patient    ..................................................               ................................................  Date                                   Time    ..........................................................................................................................................  Reviewed by Signature/Title    ...................................................              ..............................................  Date                                               Time          22EPIC Rev 08/18

## 2020-08-22 NOTE — ED PROVIDER NOTES
Emergency Department Patient Sign-out       Brief HPI:  This is a 20 year old female signed out to me by Dr. Earl Sanchez .  See initial ED Provider note for details of the presentation. Patient developed low abdominal pain that started yesterday. Pain waxes and wanes. Accompanied by nausea, no vomiting. No diarrhea or constiaption. Patient attributed pain initially to eating Lean Cuisine/shrimp.  Denies fever or chills. LMP 1 week ago, normal. She is not sexually active. No vaginal pain, bleeding or discharge.     Significant Events prior to my assuming care: none      Exam:     GENERAL APPEARANCE: alert and oriented. Mild distress/due to apin.   EYES: conjunctiva clear.  RESP: lungs clear to auscultation - no rales, rhonchi or wheezes  CV: regular rates and rhythm, normal S1 S2, no murmur noted  ABDOMEN:  soft, tenderness in the suprapubic and RLQ and LLQ (most prominent in the LLQ), no HSM or masses and bowel sounds normal      Patient Vitals for the past 24 hrs:   BP Temp Temp src Pulse Resp SpO2 Weight   08/22/20 1523 119/67 98  F (36.7  C) Oral 68 -- -- --   08/22/20 1318 128/81 97.9  F (36.6  C) Oral 81 16 99 % 72.6 kg (160 lb)           ED RESULTS:   Results for orders placed or performed during the hospital encounter of 08/22/20 (from the past 24 hour(s))   UA reflex to Microscopic     Status: Abnormal    Collection Time: 08/22/20  1:00 PM   Result Value Ref Range    Color Urine Yellow     Appearance Urine Slightly Cloudy     Glucose Urine Negative NEG^Negative mg/dL    Bilirubin Urine Negative NEG^Negative    Ketones Urine Negative NEG^Negative mg/dL    Specific Gravity Urine 1.019 1.003 - 1.035    Blood Urine Negative NEG^Negative    pH Urine 6.0 5.0 - 7.0 pH    Protein Albumin Urine Negative NEG^Negative mg/dL    Urobilinogen mg/dL 0.0 0.0 - 2.0 mg/dL    Nitrite Urine Negative NEG^Negative    Leukocyte Esterase Urine Negative NEG^Negative    Source Midstream Urine     RBC Urine 2 0 - 2 /HPF    WBC  Urine 2 0 - 5 /HPF    Bacteria Urine Moderate (A) NEG^Negative /HPF    Squamous Epithelial /HPF Urine 4 (H) 0 - 1 /HPF    Mucous Urine Present (A) NEG^Negative /LPF   CBC with platelets differential     Status: None    Collection Time: 08/22/20  2:02 PM   Result Value Ref Range    WBC 5.8 4.0 - 11.0 10e9/L    RBC Count 4.37 3.8 - 5.2 10e12/L    Hemoglobin 12.2 11.7 - 15.7 g/dL    Hematocrit 38.6 35.0 - 47.0 %    MCV 88 78 - 100 fl    MCH 27.9 26.5 - 33.0 pg    MCHC 31.6 31.5 - 36.5 g/dL    RDW 13.1 10.0 - 15.0 %    Platelet Count 331 150 - 450 10e9/L    Diff Method Automated Method     % Neutrophils 45.7 %    % Lymphocytes 44.3 %    % Monocytes 5.7 %    % Eosinophils 3.6 %    % Basophils 0.5 %    % Immature Granulocytes 0.2 %    Nucleated RBCs 0 0 /100    Absolute Neutrophil 2.7 1.6 - 8.3 10e9/L    Absolute Lymphocytes 2.6 0.8 - 5.3 10e9/L    Absolute Monocytes 0.3 0.0 - 1.3 10e9/L    Absolute Eosinophils 0.2 0.0 - 0.7 10e9/L    Absolute Basophils 0.0 0.0 - 0.2 10e9/L    Abs Immature Granulocytes 0.0 0 - 0.4 10e9/L    Absolute Nucleated RBC 0.0    Comprehensive metabolic panel     Status: None    Collection Time: 08/22/20  2:02 PM   Result Value Ref Range    Sodium 138 133 - 144 mmol/L    Potassium 3.8 3.4 - 5.3 mmol/L    Chloride 107 94 - 109 mmol/L    Carbon Dioxide 26 20 - 32 mmol/L    Anion Gap 5 3 - 14 mmol/L    Glucose 85 70 - 99 mg/dL    Urea Nitrogen 12 7 - 30 mg/dL    Creatinine 0.52 0.52 - 1.04 mg/dL    GFR Estimate >90 >60 mL/min/[1.73_m2]    GFR Estimate If Black >90 >60 mL/min/[1.73_m2]    Calcium 8.8 8.5 - 10.1 mg/dL    Bilirubin Total 0.4 0.2 - 1.3 mg/dL    Albumin 3.8 3.4 - 5.0 g/dL    Protein Total 7.7 6.8 - 8.8 g/dL    Alkaline Phosphatase 79 40 - 150 U/L    ALT 23 0 - 50 U/L    AST 13 0 - 45 U/L   HCG qualitative urine (UPT)     Status: None    Collection Time: 08/22/20  2:02 PM   Result Value Ref Range    HCG Qual Urine Negative NEG^Negative   Pelvic Ultrasound (US) with doppler imaging (r/o  ovarian torsion)     Status: None    Collection Time: 08/22/20  3:29 PM    Narrative    ULTRASOUND PELVIS DOPPLER   WITH TRANSVAGINAL IMAGING  8/22/2020 3:29  PM     HISTORY: Pelvic pain waxing and waning, evaluate for cyst/torsion.    COMPARISON: None.    TECHNIQUE: Endovaginal sonography was added to the transabdominal  scans. Grayscale, color Doppler, and Doppler spectral waveform  analysis performed.    FINDINGS:  No fibroids are evident. The uterus is 7.2 x 3.7 x 4.7 cm.  Endometrial stripe measures 10 mm and is normal for patient's age and  menstrual status. The right ovary is normal. The left ovary is normal.  Doppler spectral waveform analysis demonstrates blood flow to both  ovaries. No adnexal masses are present. No free pelvic fluid is  present.      Impression    IMPRESSION: No acute process demonstrated.    HERO RAMIREZ MD   CT Abdomen Pelvis w Contrast     Status: None (Preliminary result)    Collection Time: 08/22/20  4:46 PM    Narrative    CT ABDOMEN AND PELVIS WITH CONTRAST 8/22/2020 4:46 PM    CLINICAL HISTORY: Low abdominal pain. Tenderness bilateral lower  quadrants, more tender in the LLQ.    TECHNIQUE: CT scan of the abdomen and pelvis was performed following  injection of IV contrast. Multiplanar reformats were obtained. Dose  reduction techniques were used.    CONTRAST: 84 mL Isovue 370     COMPARISON: None.    FINDINGS:   LOWER CHEST: No infiltrates or effusions.    HEPATOBILIARY: No significant mass or bile duct dilatation. No  calcified gallstones.     PANCREAS: No significant mass, duct dilatation, or inflammatory  change.    SPLEEN: Normal size.    ADRENAL GLANDS: No significant nodules.    KIDNEYS/BLADDER: No significant mass, stones, or hydronephrosis.    BOWEL: No obstruction or inflammatory change.    PELVIC ORGANS: No pelvic masses.    ADDITIONAL FINDINGS: Epiploic appendagitis of the sigmoid colon seen  on axial image 64 series 2. Trace pelvic free fluid which is  nonspecific and  likely physiologic. No free air.    MUSCULOSKELETAL: Survey of the visualized bony structures demonstrates  no destructive bony lesions.      Impression    IMPRESSION: Epiploic appendagitis of the sigmoid colon.       ED MEDICATIONS:   Medications   ketorolac (TORADOL) injection 15 mg (15 mg Intravenous Given 8/22/20 1425)   ondansetron (ZOFRAN) injection 4 mg (4 mg Intravenous Given 8/22/20 1424)   0.9% sodium chloride BOLUS (1,000 mLs Intravenous New Bag 8/22/20 1424)   iopamidol (ISOVUE-370) solution 84 mL (84 mLs Intravenous Given 8/22/20 1621)   sodium chloride (PF) 0.9% PF flush 71 mL (71 mLs Intravenous Given 8/22/20 1621)     1600: at bedside. Nausea resolved, pain is better.  in the low abdomen on exam. Will proceed with abd/pelvis CT. Offered additional pain medication, patient declined at this time.    Impression:    ICD-10-CM    1. Epiploic appendagitis  K52.9        20 year old with 1 day history of low abdominal pain that started yesterday. Afebrile. Normotensive. No tachycardia. Tenderness in the low abdomen, mostly in the LLQ. Symptoms were accompanied by N/V. No fevers, diarrhea, constipation, or urinary symptoms. Normal labs. Normal UA. Negative UPT. No evidence of UTI.  Pelvic US obtained is normal- specifically no evidence of ovarian torsion or ovarian cyst. No mass/fibroids.  Abdominal/Pelvis CT obtained and reveals an epiploic appendagitis of the sigmoid colon. No evidence of appendicitis, bowel obstruction, mass, diverticulitis, or other abdominal pathology. I reviewed the lab and imaging findings with patient and her mother.  We discussed symptomatic management for her pain and reassurance was provided.  Patient was given IV normal saline 1 L bolus, IV Toradol, and IV Zofran here in the emergency department.  Nausea has improved.  Pain improved slightly, but still pretty tender when I palpate.      Plan:    Zofran 4 mg every 8 hours as needed for nausea.  Tylenol 650 mg every 4-6  hours as needed for pain.  Ibuprofen 400-600 mg every 6-8 hours as needed for pain  (take with food, stop if causing stomach pains.)  Oxycodone 5 mg every 6 hours as needed for moderate/severe pain. Do not drive or drink alcohol on this medication.  Rest.  Drink plenty of fluids.  Return for fever, vomiting, increased pain, or new symptoms of concern.        BEN Burroughs CNP, Antonette Marie, APRN CNP  08/22/20 [id number]

## 2020-08-22 NOTE — ED TRIAGE NOTES
C/o lower abd pain across abd that started last night after eating a shrimp lean cuisine. C/o nausea, no vomiting, no diarrhea, last BM was today, no change in pain after BM.

## 2020-08-22 NOTE — ED PROVIDER NOTES
History     Chief Complaint   Patient presents with     Abdominal Pain     HPI  Sofy Stringer is a 20 year old female who presents from home with her mother.  She describes lower abdominal pain that comes and goes, described as severe and sharp at its worst, nonradiating, spreads across suprapubic area, not necessarily worse with movement.  Denies inciting trauma or strain.  Describes associated nausea without vomiting, denies diarrhea black bloody stool or constipation.  Denies urinary symptoms.  LMP a week ago, patient is not sexually active.  Denies vaginal discharge.  No prior abdominal surgery.  No prior such abdominal pain.  No prescription meds.  Non-smoker no alcohol no illicit drug use.  Paternal grandmother with multiple ovarian cysts, maternal uncle with kidney stone    Allergies:  Allergies   Allergen Reactions     Penicillins Rash       Problem List:    Patient Active Problem List    Diagnosis Date Noted     Obesity 01/28/2014     Priority: Medium     Acne 01/28/2014     Priority: Medium        Past Medical History:    No past medical history on file.    Past Surgical History:    Past Surgical History:   Procedure Laterality Date     TONSILLECTOMY & ADENOIDECTOMY  2002       Family History:    Family History   Problem Relation Age of Onset     Thyroid Disease Father      Hypertension Maternal Grandmother      Lipids Maternal Grandmother      Skin Cancer Maternal Grandmother      Diabetes Maternal Grandmother      Hyperlipidemia Maternal Grandmother      Cancer - colorectal Maternal Grandfather      Cancer Maternal Grandfather      Colon Cancer Maternal Grandfather      Asthma Maternal Grandfather      Cerebrovascular Disease Other      Glaucoma No family hx of      Macular Degeneration No family hx of        Social History:  Marital Status:  Single [1]  Social History     Tobacco Use     Smoking status: Never Smoker     Smokeless tobacco: Never Used     Tobacco comment: Lives in smoke free  household   Substance Use Topics     Alcohol use: No     Drug use: No        Medications:    ondansetron (ZOFRAN) 4 MG tablet  oxyCODONE (ROXICODONE) 5 MG tablet  benzoyl peroxide (BENZOYL PEROXIDE WASH) 5 % external liquid  Bioflavonoid Products (VITAMIN C) CHEW  Iron Combinations (IRON COMPLEX) CAPS  MULTI VIT/FL OR  Probiotic Product (PRO-BIOTIC BLEND) CAPS          Review of Systems  All other systems reviewed and are negative.    Physical Exam   BP: 128/81  Pulse: 81  Temp: 97.9  F (36.6  C)  Resp: 16  Weight: 72.6 kg (160 lb)  SpO2: 99 %      Physical Exam  Nontoxic appearing no respiratory distress alert and oriented ×3  Head atraumatic normocephalicy   Neck supple full active painless range of motion  Lungs clear to auscultation  Heart regular no murmur  Abdomen soft mildly tender bilateral lower quadrants/pelvis as well as suprapubic region, voluntary guarding no rebound, no distention bowel sounds are diminished  Strength and sensation grossly intact throughout the extremities, gait and station normal  Speech is fluent, good eye contact, thought processes are rational    ED Course        Procedures             Results for orders placed or performed during the hospital encounter of 08/22/20   Pelvic Ultrasound (US) with doppler imaging (r/o ovarian torsion)     Status: None    Narrative    ULTRASOUND PELVIS DOPPLER   WITH TRANSVAGINAL IMAGING  8/22/2020 3:29  PM     HISTORY: Pelvic pain waxing and waning, evaluate for cyst/torsion.    COMPARISON: None.    TECHNIQUE: Endovaginal sonography was added to the transabdominal  scans. Grayscale, color Doppler, and Doppler spectral waveform  analysis performed.    FINDINGS:  No fibroids are evident. The uterus is 7.2 x 3.7 x 4.7 cm.  Endometrial stripe measures 10 mm and is normal for patient's age and  menstrual status. The right ovary is normal. The left ovary is normal.  Doppler spectral waveform analysis demonstrates blood flow to both  ovaries. No adnexal  masses are present. No free pelvic fluid is  present.      Impression    IMPRESSION: No acute process demonstrated.    HERO RAMIREZ MD   CT Abdomen Pelvis w Contrast     Status: None    Narrative    CT ABDOMEN AND PELVIS WITH CONTRAST 8/22/2020 4:46 PM    CLINICAL HISTORY: Low abdominal pain. Tenderness bilateral lower  quadrants, more tender in the LLQ.    TECHNIQUE: CT scan of the abdomen and pelvis was performed following  injection of IV contrast. Multiplanar reformats were obtained. Dose  reduction techniques were used.    CONTRAST: 84 mL Isovue 370     COMPARISON: None.    FINDINGS:   LOWER CHEST: No infiltrates or effusions.    HEPATOBILIARY: No significant mass or bile duct dilatation. No  calcified gallstones.     PANCREAS: No significant mass, duct dilatation, or inflammatory  change.    SPLEEN: Normal size.    ADRENAL GLANDS: No significant nodules.    KIDNEYS/BLADDER: No significant mass, stones, or hydronephrosis.    BOWEL: No obstruction or inflammatory change.    PELVIC ORGANS: No pelvic masses.    ADDITIONAL FINDINGS: Epiploic appendagitis of the sigmoid colon seen  on axial image 64 series 2. Trace pelvic free fluid which is  nonspecific and likely physiologic. No free air.    MUSCULOSKELETAL: Survey of the visualized bony structures demonstrates  no destructive bony lesions.      Impression    IMPRESSION: Epiploic appendagitis of the sigmoid colon.    HERO RAMIREZ MD   CBC with platelets differential     Status: None   Result Value Ref Range    WBC 5.8 4.0 - 11.0 10e9/L    RBC Count 4.37 3.8 - 5.2 10e12/L    Hemoglobin 12.2 11.7 - 15.7 g/dL    Hematocrit 38.6 35.0 - 47.0 %    MCV 88 78 - 100 fl    MCH 27.9 26.5 - 33.0 pg    MCHC 31.6 31.5 - 36.5 g/dL    RDW 13.1 10.0 - 15.0 %    Platelet Count 331 150 - 450 10e9/L    Diff Method Automated Method     % Neutrophils 45.7 %    % Lymphocytes 44.3 %    % Monocytes 5.7 %    % Eosinophils 3.6 %    % Basophils 0.5 %    % Immature Granulocytes 0.2 %     "Nucleated RBCs 0 0 /100    Absolute Neutrophil 2.7 1.6 - 8.3 10e9/L    Absolute Lymphocytes 2.6 0.8 - 5.3 10e9/L    Absolute Monocytes 0.3 0.0 - 1.3 10e9/L    Absolute Eosinophils 0.2 0.0 - 0.7 10e9/L    Absolute Basophils 0.0 0.0 - 0.2 10e9/L    Abs Immature Granulocytes 0.0 0 - 0.4 10e9/L    Absolute Nucleated RBC 0.0    Comprehensive metabolic panel     Status: None   Result Value Ref Range    Sodium 138 133 - 144 mmol/L    Potassium 3.8 3.4 - 5.3 mmol/L    Chloride 107 94 - 109 mmol/L    Carbon Dioxide 26 20 - 32 mmol/L    Anion Gap 5 3 - 14 mmol/L    Glucose 85 70 - 99 mg/dL    Urea Nitrogen 12 7 - 30 mg/dL    Creatinine 0.52 0.52 - 1.04 mg/dL    GFR Estimate >90 >60 mL/min/[1.73_m2]    GFR Estimate If Black >90 >60 mL/min/[1.73_m2]    Calcium 8.8 8.5 - 10.1 mg/dL    Bilirubin Total 0.4 0.2 - 1.3 mg/dL    Albumin 3.8 3.4 - 5.0 g/dL    Protein Total 7.7 6.8 - 8.8 g/dL    Alkaline Phosphatase 79 40 - 150 U/L    ALT 23 0 - 50 U/L    AST 13 0 - 45 U/L   UA reflex to Microscopic     Status: Abnormal   Result Value Ref Range    Color Urine Yellow     Appearance Urine Slightly Cloudy     Glucose Urine Negative NEG^Negative mg/dL    Bilirubin Urine Negative NEG^Negative    Ketones Urine Negative NEG^Negative mg/dL    Specific Gravity Urine 1.019 1.003 - 1.035    Blood Urine Negative NEG^Negative    pH Urine 6.0 5.0 - 7.0 pH    Protein Albumin Urine Negative NEG^Negative mg/dL    Urobilinogen mg/dL 0.0 0.0 - 2.0 mg/dL    Nitrite Urine Negative NEG^Negative    Leukocyte Esterase Urine Negative NEG^Negative    Source Midstream Urine     RBC Urine 2 0 - 2 /HPF    WBC Urine 2 0 - 5 /HPF    Bacteria Urine Moderate (A) NEG^Negative /HPF    Squamous Epithelial /HPF Urine 4 (H) 0 - 1 /HPF    Mucous Urine Present (A) NEG^Negative /LPF   HCG qualitative urine (UPT)     Status: None   Result Value Ref Range    HCG Qual Urine Negative NEG^Negative     \"    Critical Care time:  none                   Medications   ketorolac " (TORADOL) injection 15 mg (15 mg Intravenous Given 8/22/20 1425)   ondansetron (ZOFRAN) injection 4 mg (4 mg Intravenous Given 8/22/20 1424)   0.9% sodium chloride BOLUS (0 mLs Intravenous Stopped 8/22/20 1739)   iopamidol (ISOVUE-370) solution 84 mL (84 mLs Intravenous Given 8/22/20 1621)   sodium chloride (PF) 0.9% PF flush 71 mL (71 mLs Intravenous Given 8/22/20 1621)       Assessments & Plan (with Medical Decision Making)  20-year-old female abdominal pain details per HPI signed out to Nik Pradhan at change of shift awaiting results of work-up.  Ultimately found to have epiploic's appendagitis sigmoid colon by CT scan.  Symptomatic cares return criteria reviewed     I have reviewed the nursing notes.    I have reviewed the findings, diagnosis, plan and need for follow up with the patient.          Discharge Medication List as of 8/22/2020  5:40 PM      START taking these medications    Details   ondansetron (ZOFRAN) 4 MG tablet Take 1 tablet (4 mg) by mouth every 8 hours as needed for nausea, Disp-6 tablet,R-0, E-Prescribe      oxyCODONE (ROXICODONE) 5 MG tablet Take 1 tablet (5 mg) by mouth every 6 hours as needed for pain, Disp-8 tablet,R-0, Local Print             Final diagnoses:   Epiploic appendagitis - sigmoid colon       8/22/2020   [facility] EMERGENCY DEPARTMENT     David Vasquez MD  08/23/20 0724

## 2020-08-22 NOTE — LETTER
August 22, 2020      To Whom It May Concern:      Sofy Stringer was seen in our Emergency Department today, 08/22/20.  No work 8/22/2020 - 8/23/202.  Ok to return to work 8/24/2020 if feeling better.    Sincerely,        BEN Burroughs CNP

## 2020-08-22 NOTE — DISCHARGE INSTRUCTIONS
Zofran 4 mg every 8 hours as needed for nausea.  Tylenol 650 mg every 4-6 hours as needed for pain.  Ibuprofen 400-600 mg every 6-8 hours as needed for pain  (take with food, stop if causing stomach pains.)  Oxycodone 5 mg every 6 hours as needed for moderate/severe pain. Do not drive or drink alcohol on this medication.  Rest.  Drink plenty of fluids.  Return for fever, vomiting, increased pain, or new symptoms of concern.

## 2020-09-27 ENCOUNTER — OFFICE VISIT (OUTPATIENT)
Dept: URGENT CARE | Facility: URGENT CARE | Age: 20
End: 2020-09-27
Payer: COMMERCIAL

## 2020-09-27 VITALS
HEART RATE: 98 BPM | DIASTOLIC BLOOD PRESSURE: 62 MMHG | SYSTOLIC BLOOD PRESSURE: 100 MMHG | TEMPERATURE: 98.4 F | OXYGEN SATURATION: 99 %

## 2020-09-27 DIAGNOSIS — L50.9 HIVES: Primary | ICD-10-CM

## 2020-09-27 PROCEDURE — 99213 OFFICE O/P EST LOW 20 MIN: CPT | Performed by: NURSE PRACTITIONER

## 2020-09-27 RX ORDER — PREDNISONE 20 MG/1
20 TABLET ORAL 2 TIMES DAILY
Qty: 10 TABLET | Refills: 0 | Status: SHIPPED | OUTPATIENT
Start: 2020-09-27 | End: 2020-10-02

## 2020-09-27 ASSESSMENT — ENCOUNTER SYMPTOMS
DIARRHEA: 0
FEVER: 0
NAUSEA: 0
CHILLS: 0
VOMITING: 0
RHINORRHEA: 0
SHORTNESS OF BREATH: 0
SORE THROAT: 0
HEADACHES: 0
COUGH: 0

## 2020-09-27 NOTE — PROGRESS NOTES
SUBJECTIVE:   Sofy Stringer is a 20 year old female presenting with a chief complaint of   Chief Complaint   Patient presents with     Derm Problem     redness comes and goes all over body. itching waxing and waning--benadryl helps for a few hours       She is an established patient of [facility].    Rash  Onset of rash was 5 day ago.  Location of the rash: thighs.  Associated symptoms include: itching and redness.  Symptoms appear to be worsening.  Therapies tried to improve the rash: OTC Topical Antihistamines benadryl.  Previous history of a similar rash? No  Recent exposure history: none known       Review of Systems   Constitutional: Negative for chills and fever.   HENT: Negative for congestion, ear pain, rhinorrhea and sore throat.    Respiratory: Negative for cough and shortness of breath.    Gastrointestinal: Negative for diarrhea, nausea and vomiting.   Skin: Positive for rash.   Neurological: Negative for headaches.   All other systems reviewed and are negative.      No past medical history on file.  Family History   Problem Relation Age of Onset     Thyroid Disease Father      Hypertension Maternal Grandmother      Lipids Maternal Grandmother      Skin Cancer Maternal Grandmother      Diabetes Maternal Grandmother      Hyperlipidemia Maternal Grandmother      Cancer - colorectal Maternal Grandfather      Cancer Maternal Grandfather      Colon Cancer Maternal Grandfather      Asthma Maternal Grandfather      Cerebrovascular Disease Other      Glaucoma No family hx of      Macular Degeneration No family hx of      Current Outpatient Medications   Medication Sig Dispense Refill     benzoyl peroxide (BENZOYL PEROXIDE WASH) 5 % external liquid WASH FACE DAILY 227 g 5     Bioflavonoid Products (VITAMIN C) CHEW Take 2 chew tab by mouth daily       Iron Combinations (IRON COMPLEX) CAPS Take 1 capsule by mouth daily       MULTI VIT/FL OR None Entered       ondansetron (ZOFRAN) 4 MG tablet Take 1 tablet (4 mg) by  mouth every 8 hours as needed for nausea 6 tablet 0     oxyCODONE (ROXICODONE) 5 MG tablet Take 1 tablet (5 mg) by mouth every 6 hours as needed for pain 8 tablet 0     predniSONE (DELTASONE) 20 MG tablet Take 1 tablet (20 mg) by mouth 2 times daily for 5 days 10 tablet 0     Probiotic Product (PRO-BIOTIC BLEND) CAPS Take 1 capsule by mouth daily Reported on 5/11/2017       Social History     Tobacco Use     Smoking status: Never Smoker     Smokeless tobacco: Never Used     Tobacco comment: Lives in smoke free household   Substance Use Topics     Alcohol use: No       OBJECTIVE  /62   Pulse 98   Temp 98.4  F (36.9  C) (Tympanic)   SpO2 99%     Physical Exam  Vitals signs and nursing note reviewed.   Constitutional:       General: She is not in acute distress.     Appearance: She is well-developed. She is not diaphoretic.   HENT:      Head: Normocephalic and atraumatic.      Right Ear: Tympanic membrane and external ear normal.      Left Ear: Tympanic membrane and external ear normal.   Eyes:      Pupils: Pupils are equal, round, and reactive to light.   Neck:      Musculoskeletal: Normal range of motion and neck supple.   Pulmonary:      Effort: Pulmonary effort is normal. No respiratory distress.      Breath sounds: Normal breath sounds.   Lymphadenopathy:      Cervical: No cervical adenopathy.   Skin:     General: Skin is warm and dry.      Comments: Examination of the rash reveals erythematous multiple pleomorphic, raised, well-defined, blanching patches with wheals and flares.   Neurological:      Mental Status: She is alert.      Cranial Nerves: No cranial nerve deficit.       ASSESSMENT:      ICD-10-CM    1. Hives  L50.9 predniSONE (DELTASONE) 20 MG tablet          PLAN:  Hives are generally idiopathic; possibly related to foods, sun, heat, cold or viruses. Use prednisone low dose and OTC benadryl, call if symptoms persist or worsen. Can try other antihistamines or possibly H2 blockers later  prn.        Patient Instructions     Patient Education     Hives (Adult)  Hives are pink or red bumps on the skin. These bumps are also known as wheals. The bumps can itch, burn, or sting. Hives can occur anywhere on the body. They vary in size and shape and can form in clusters. Individual hives can appear and go away quickly. New hives may develop as old ones fade. Hives are common and usually harmless. Occasionally hives are a sign of a serious allergy.  Hives are often caused by an allergic reaction. It may be an allergic reaction to foods such as fruit, shellfish, chocolate, nuts, or tomatoes. It may be a reaction to pollens, animal fur, or mold spores. Medicines, chemicals, and insect bites can also cause hives. And hives can be caused by hot sun or cold air. The cause of hives can be difficult to find.  You may be given medicines to relieve swelling and itching. Follow all instructions when using these medicines. The hives will usually fade in a few days, but can last up to 2 weeks.  Home care  Follow these tips:    Try to find the cause of the hives and eliminate it. Discuss possible causes with your healthcare provider. Future reactions to the same allergen may be worse.    Don t scratch the hives. Scratching will delay healing. To reduce itching, apply cool, wet compresses to the skin.    Dress in soft, loose cotton clothing.    Don t bathe in hot water. This can make the itching worse.    Apply an ice pack or cool pack wrapped in a thin towel to your skin. This will help reduce redness and itching. But if your hives were caused by exposure to cold, then do not apply more cold to them.    You may use over-the counter antihistamines to reduce itching. Some older antihistamines, such as diphenhydramine and chlorpheniramine, are inexpensive. But they need to be taken often and may make you sleepy. They are best used at bedtime. Don t use diphenhydramine if you have glaucoma or have trouble urinating because  of an enlarged prostate. Newer antihistamines, such as loratadine, cetirizine, and fexofenadine, are generally more expensive. But they tend to have fewer side effects, such as drowsiness. They can be taken less often.    Another type of antihistamine is used to treat heartburn. This type includes ranitidine, nizatidine, famotidine, and cimetidine. These are sometimes used along with the above antihistamines if a single medicine is not working.  Follow-up care  Follow up with your healthcare provider if your symptoms don't get better in 2 days. Ask your provider about allergy testing if you have had a severe reaction, or have had several episodes of hives. He or she can use the allergy testing to find out what you are allergic to.  When to seek medical advice  Call your healthcare provider right away if any of these occur:    Fever of 100.4 F (38.0 C) or higher, or as directed by your healthcare provider    Redness, swelling, or pain    Foul-smelling fluid coming from the rash  Call 911  Call 911 if any of the following occur:    Swelling of the face, throat, or tongue    Trouble breathing or swallowing    Dizziness, weakness, or fainting  Date Last Reviewed: 9/1/2016 2000-2019 [employer]. [address] [place] [address], [place] [address]. All rights reserved. This information is not intended as a substitute for professional medical care. Always follow your healthcare professional's instructions.

## 2020-09-27 NOTE — PATIENT INSTRUCTIONS
Patient Education     Hives (Adult)  Hives are pink or red bumps on the skin. These bumps are also known as wheals. The bumps can itch, burn, or sting. Hives can occur anywhere on the body. They vary in size and shape and can form in clusters. Individual hives can appear and go away quickly. New hives may develop as old ones fade. Hives are common and usually harmless. Occasionally hives are a sign of a serious allergy.  Hives are often caused by an allergic reaction. It may be an allergic reaction to foods such as fruit, shellfish, chocolate, nuts, or tomatoes. It may be a reaction to pollens, animal fur, or mold spores. Medicines, chemicals, and insect bites can also cause hives. And hives can be caused by hot sun or cold air. The cause of hives can be difficult to find.  You may be given medicines to relieve swelling and itching. Follow all instructions when using these medicines. The hives will usually fade in a few days, but can last up to 2 weeks.  Home care  Follow these tips:    Try to find the cause of the hives and eliminate it. Discuss possible causes with your healthcare provider. Future reactions to the same allergen may be worse.    Don t scratch the hives. Scratching will delay healing. To reduce itching, apply cool, wet compresses to the skin.    Dress in soft, loose cotton clothing.    Don t bathe in hot water. This can make the itching worse.    Apply an ice pack or cool pack wrapped in a thin towel to your skin. This will help reduce redness and itching. But if your hives were caused by exposure to cold, then do not apply more cold to them.    You may use over-the counter antihistamines to reduce itching. Some older antihistamines, such as diphenhydramine and chlorpheniramine, are inexpensive. But they need to be taken often and may make you sleepy. They are best used at bedtime. Don t use diphenhydramine if you have glaucoma or have trouble urinating because of an enlarged prostate. Newer  antihistamines, such as loratadine, cetirizine, and fexofenadine, are generally more expensive. But they tend to have fewer side effects, such as drowsiness. They can be taken less often.    Another type of antihistamine is used to treat heartburn. This type includes ranitidine, nizatidine, famotidine, and cimetidine. These are sometimes used along with the above antihistamines if a single medicine is not working.  Follow-up care  Follow up with your healthcare provider if your symptoms don't get better in 2 days. Ask your provider about allergy testing if you have had a severe reaction, or have had several episodes of hives. He or she can use the allergy testing to find out what you are allergic to.  When to seek medical advice  Call your healthcare provider right away if any of these occur:    Fever of 100.4 F (38.0 C) or higher, or as directed by your healthcare provider    Redness, swelling, or pain    Foul-smelling fluid coming from the rash  Call 911  Call 911 if any of the following occur:    Swelling of the face, throat, or tongue    Trouble breathing or swallowing    Dizziness, weakness, or fainting  Date Last Reviewed: 9/1/2016 2000-2019 [employer]. [address] [place] [address], [place] [address]. All rights reserved. This information is not intended as a substitute for professional medical care. Always follow your healthcare professional's instructions.

## 2020-10-05 ENCOUNTER — OFFICE VISIT (OUTPATIENT)
Dept: ALLERGY | Facility: CLINIC | Age: 20
End: 2020-10-05
Payer: COMMERCIAL

## 2020-10-05 VITALS
OXYGEN SATURATION: 96 % | BODY MASS INDEX: 31.5 KG/M2 | HEART RATE: 104 BPM | DIASTOLIC BLOOD PRESSURE: 72 MMHG | SYSTOLIC BLOOD PRESSURE: 122 MMHG | WEIGHT: 164 LBS

## 2020-10-05 DIAGNOSIS — L50.9 URTICARIA: Primary | ICD-10-CM

## 2020-10-05 PROCEDURE — 99203 OFFICE O/P NEW LOW 30 MIN: CPT | Performed by: ALLERGY & IMMUNOLOGY

## 2020-10-05 RX ORDER — DIPHENHYDRAMINE HCL 25 MG
50 TABLET ORAL EVERY 6 HOURS PRN
COMMUNITY
End: 2022-09-28

## 2020-10-05 RX ORDER — CETIRIZINE HYDROCHLORIDE 10 MG/1
10 TABLET ORAL DAILY
COMMUNITY

## 2020-10-05 NOTE — LETTER
10/5/2020         RE: Sofy Stringer  [address] [facility] [id number]        Dear Colleague,    Thank you for referring your patient, Sofy Stringer, to the [facility]. Please see a copy of my visit note below.    Sofy Stringer was seen in the Allergy Clinic at [facility].    Sofy Stringer is a 20 year old White female being seen today in consultation for hives. She reports that her symptoms began approximately 2 weeks ago. After the hives had persisted for 3 days she presented for evaluation and was prescribed prednisone. She continued to have symptoms while on the prednisone but they were far less intense. 3 days after stopping the prednisone the hives returned. Sofy can tell when the hives are going to start because she will feel some itching or tingling and then the hives will appear. Without medication the hives would last for several hours but with antihistamines the lesions resolve within 30 minutes. When she scratches the lesions become larger and will itch more. She has not had any lip or tongue swelling, difficulty swallowing, cough, shortness of breath, chest tightness, or wheezing.     Last night she took diphenhydramine and the itching resolved and she was able to sleep. Today she took cetirizine and the hives have improved. She does not currently have any lesions but has pictures demonstrating several urticarial lesions on her extremities. Sofy reports that she has not been sleeping well and has been under increased stress due to school. She has not had any recent illness but was seen in the ED last August and diagnosed with epiploic appendagitis of the sigmoid colon. These symptoms have since resolved. She has not had any recent changes to her environment, soap, detergents, or personal care products, or diet.      PAST MEDICAL HISTORY:  None    Family History   Problem Relation Age of Onset     Thyroid Disease Father       Hypertension Maternal Grandmother      Lipids Maternal Grandmother      Skin Cancer Maternal Grandmother      Diabetes Maternal Grandmother      Hyperlipidemia Maternal Grandmother      Cancer - colorectal Maternal Grandfather      Cancer Maternal Grandfather      Colon Cancer Maternal Grandfather      Asthma Maternal Grandfather      Cerebrovascular Disease Other      Glaucoma No family hx of      Macular Degeneration No family hx of      Past Surgical History:   Procedure Laterality Date     TONSILLECTOMY & ADENOIDECTOMY  2002       ENVIRONMENTAL HISTORY: [place]. The home is heated with a forced air and gas furnace. They do have central air conditioning. The patient's bedroom is furnished with carpeting in bedroom and fabric window coverings.  Pets inside the house include 1 dog(s). There is no history of cockroach or mice infestation. There is/are 1 smokers living in the house.  There is/are 0 who smoke ecigarettes/vape living in the house.The house does not have a damp basement.     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 She lives with her mother, grandmother, and brother.  Her mother is an RN.    REVIEW OF SYSTEMS:  General: negative for weight gain. negative for weight loss. positive  for changes in sleep.   Eyes: negative for itching. negative for redness. negative for tearing/watering. negative for vision changes  Ears: negative for fullness. negative for hearing loss. negative for dizziness.   Nose: negative for snoring.negative for changes in smell. negative for drainage.   Throat: negative for hoarseness. negative for sore throat. negative for trouble swallowing.   Lungs: negative for cough. negative for shortness of breath.negative for wheezing. negative for sputum production.   Cardiovascular: negative for chest pain. negative for swelling of ankles. negative for fast or irregular heartbeat.    Gastrointestinal: negative for nausea. negative for heartburn. negative for acid reflux.   Musculoskeletal: negative for joint pain. negative for joint stiffness. negative for joint swelling.   Neurologic: negative for seizures. negative for fainting. negative for weakness.   Psychiatric: negative for changes in mood. negative for anxiety.   Endocrine: negative for cold intolerance. negative for heat intolerance. negative for tremors.   Hematologic: negative for easy bruising. negative for easy bleeding.  Integumentary: positive  for rash. negative for scaling. negative for nail changes.       Current Outpatient Medications:      benzoyl peroxide (BENZOYL PEROXIDE WASH) 5 % external liquid, WASH FACE DAILY, Disp: 227 g, Rfl: 5     Bioflavonoid Products (VITAMIN C) CHEW, Take 2 chew tab by mouth daily, Disp: , Rfl:      cetirizine (ZYRTEC) 10 MG tablet, Take 10 mg by mouth daily, Disp: , Rfl:      diphenhydrAMINE (BENADRYL) 25 MG tablet, Take 50 mg by mouth every 6 hours as needed for itching or allergies, Disp: , Rfl:      Iron Combinations (IRON COMPLEX) CAPS, Take 1 capsule by mouth daily, Disp: , Rfl:      MULTI VIT/FL OR, None Entered, Disp: , Rfl:      Probiotic Product (PRO-BIOTIC BLEND) CAPS, Take 1 capsule by mouth daily Reported on 5/11/2017, Disp: , Rfl:      ondansetron (ZOFRAN) 4 MG tablet, Take 1 tablet (4 mg) by mouth every 8 hours as needed for nausea (Patient not taking: Reported on 10/5/2020), Disp: 6 tablet, Rfl: 0     oxyCODONE (ROXICODONE) 5 MG tablet, Take 1 tablet (5 mg) by mouth every 6 hours as needed for pain (Patient not taking: Reported on 10/5/2020), Disp: 8 tablet, Rfl: 0  Immunization History   Administered Date(s) Administered     DTAP (<7y) 1999, 1999, 1999, 05/25/2000, 03/19/2004, 08/12/2010     HEPA 11/02/2007, 10/10/2008     HPV 08/12/2010, 08/18/2011, 03/16/2012     HepB 1999, 1999, 05/25/2000     HepB-Adult 07/28/2020     Hib (PRP-T) 1999      Influenza (IIV3) PF 11/04/2005, 11/16/2006, 11/02/2007, 10/06/2010, 10/22/2011, 01/08/2013     Influenza Intranasal Vaccine 10/10/2008     Influenza Vaccine IM > 6 months Valent IIV4 11/12/2013, 10/17/2014, 10/26/2015, 11/19/2016, 01/24/2018, 11/10/2019     Influenza Vaccine, 6+MO IM (QUADRIVALENT W/PRESERVATIVES) 10/20/2018     MMR 02/10/2000, 02/12/2001, 10/10/2008     Mantoux Tuberculin Skin Test 03/22/2016     Meningococcal (Menactra ) 06/14/2018     Meningococcal (Menomune ) 08/12/2010     Poliovirus, inactivated (IPV) 1999, 1999, 1999, 03/19/2004     TDAP Vaccine (Adacel) 07/14/2020     Tdap (Adacel,Boostrix) 08/12/2010     Varicella 02/10/2000, 10/10/2008     Allergies   Allergen Reactions     Penicillins Rash         EXAM:   /72 (BP Location: Left arm, Patient Position: Sitting, Cuff Size: Adult Large)   Pulse 104   Wt 74.4 kg (164 lb)   SpO2 96%   BMI 31.50 kg/m    GENERAL APPEARANCE: alert, cooperative and not in distress  SKIN: no rashes, no lesions  HEAD: atraumatic, normocephalic  EYES: lids and lashes normal, conjunctivae and sclerae clear, EOM full and intact  ENT: no scars or lesions, nasal exam showed no discharge, swelling or lesions noted, otoscopy showed external auditory canals clear, tympanic membranes normal, tongue midline and normal, soft palate, uvula, and tonsils normal  NECK: no asymmetry, masses, or scars, supple without significant adenopathy  LUNGS: unlabored respirations, no intercostal retractions or accessory muscle use, clear to auscultation without rales or wheezes  HEART: regular rate and rhythm without murmurs and normal S1 and S2  MUSCULOSKELETAL: no musculoskeletal defects are noted  NEURO: no focal deficits noted  PSYCH: does not appear depressed or anxious    WORKUP: None    ASSESSMENT/PLAN:  Sofy Stringer is a 20 year old female seen for evaluation of urticaria.    1. Urticaria - Sofy's symptoms began approximately 2 weeks ago. Her  symptoms improved with prednisone however she experienced another flare once she completed the course of steroids. She has found that antihistamines have been helpful in terms of controlling her symptoms. There is no obvious trigger for her symptoms though poor sleep and increased stress may be the cause.     - continue cetirizine 10mg daily - increase to twice daily if having break through symptoms  - continue with daily antihistamine until symptoms have resolved x 2 weeks, then may hold medication and monitor for return of symptoms  - follow-up if hives return or persist for more than 2 weeks      Thank you for allowing me to participate in the care of Sofy Stringer.      Gregg Mills MD, FAAAAI  Allergy/Immunology  [facility]      Chart documentation done in part with Dragon Voice Recognition Software. Although reviewed after completion, some word and grammatical errors may remain.      Again, thank you for allowing me to participate in the care of your patient.        Sincerely,        Gregg Mills MD

## 2020-10-05 NOTE — PROGRESS NOTES
Sofy Stringer was seen in the Allergy Clinic at [facility].    Sofy Stringer is a 20 year old White female being seen today in consultation for hives. She reports that her symptoms began approximately 2 weeks ago. After the hives had persisted for 3 days she presented for evaluation and was prescribed prednisone. She continued to have symptoms while on the prednisone but they were far less intense. 3 days after stopping the prednisone the hives returned. Sofy can tell when the hives are going to start because she will feel some itching or tingling and then the hives will appear. Without medication the hives would last for several hours but with antihistamines the lesions resolve within 30 minutes. When she scratches the lesions become larger and will itch more. She has not had any lip or tongue swelling, difficulty swallowing, cough, shortness of breath, chest tightness, or wheezing.     Last night she took diphenhydramine and the itching resolved and she was able to sleep. Today she took cetirizine and the hives have improved. She does not currently have any lesions but has pictures demonstrating several urticarial lesions on her extremities. Sofy reports that she has not been sleeping well and has been under increased stress due to school. She has not had any recent illness but was seen in the ED last August and diagnosed with epiploic appendagitis of the sigmoid colon. These symptoms have since resolved. She has not had any recent changes to her environment, soap, detergents, or personal care products, or diet.      PAST MEDICAL HISTORY:  None    Family History   Problem Relation Age of Onset     Thyroid Disease Father      Hypertension Maternal Grandmother      Lipids Maternal Grandmother      Skin Cancer Maternal Grandmother      Diabetes Maternal Grandmother      Hyperlipidemia Maternal Grandmother      Cancer - colorectal Maternal Grandfather      Cancer Maternal  Grandfather      Colon Cancer Maternal Grandfather      Asthma Maternal Grandfather      Cerebrovascular Disease Other      Glaucoma No family hx of      Macular Degeneration No family hx of      Past Surgical History:   Procedure Laterality Date     TONSILLECTOMY & ADENOIDECTOMY  2002       ENVIRONMENTAL HISTORY: [place]. The home is heated with a forced air and gas furnace. They do have central air conditioning. The patient's bedroom is furnished with carpeting in bedroom and fabric window coverings.  Pets inside the house include 1 dog(s). There is no history of cockroach or mice infestation. There is/are 1 smokers living in the house.  There is/are 0 who smoke ecigarettes/vape living in the house.The house does not have a damp basement.     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 She lives with her mother, grandmother, and brother.  Her mother is an RN.    REVIEW OF SYSTEMS:  General: negative for weight gain. negative for weight loss. positive  for changes in sleep.   Eyes: negative for itching. negative for redness. negative for tearing/watering. negative for vision changes  Ears: negative for fullness. negative for hearing loss. negative for dizziness.   Nose: negative for snoring.negative for changes in smell. negative for drainage.   Throat: negative for hoarseness. negative for sore throat. negative for trouble swallowing.   Lungs: negative for cough. negative for shortness of breath.negative for wheezing. negative for sputum production.   Cardiovascular: negative for chest pain. negative for swelling of ankles. negative for fast or irregular heartbeat.   Gastrointestinal: negative for nausea. negative for heartburn. negative for acid reflux.   Musculoskeletal: negative for joint pain. negative for joint stiffness. negative for joint swelling.   Neurologic: negative for seizures. negative for fainting.  negative for weakness.   Psychiatric: negative for changes in mood. negative for anxiety.   Endocrine: negative for cold intolerance. negative for heat intolerance. negative for tremors.   Hematologic: negative for easy bruising. negative for easy bleeding.  Integumentary: positive  for rash. negative for scaling. negative for nail changes.       Current Outpatient Medications:      benzoyl peroxide (BENZOYL PEROXIDE WASH) 5 % external liquid, WASH FACE DAILY, Disp: 227 g, Rfl: 5     Bioflavonoid Products (VITAMIN C) CHEW, Take 2 chew tab by mouth daily, Disp: , Rfl:      cetirizine (ZYRTEC) 10 MG tablet, Take 10 mg by mouth daily, Disp: , Rfl:      diphenhydrAMINE (BENADRYL) 25 MG tablet, Take 50 mg by mouth every 6 hours as needed for itching or allergies, Disp: , Rfl:      Iron Combinations (IRON COMPLEX) CAPS, Take 1 capsule by mouth daily, Disp: , Rfl:      MULTI VIT/FL OR, None Entered, Disp: , Rfl:      Probiotic Product (PRO-BIOTIC BLEND) CAPS, Take 1 capsule by mouth daily Reported on 5/11/2017, Disp: , Rfl:      ondansetron (ZOFRAN) 4 MG tablet, Take 1 tablet (4 mg) by mouth every 8 hours as needed for nausea (Patient not taking: Reported on 10/5/2020), Disp: 6 tablet, Rfl: 0     oxyCODONE (ROXICODONE) 5 MG tablet, Take 1 tablet (5 mg) by mouth every 6 hours as needed for pain (Patient not taking: Reported on 10/5/2020), Disp: 8 tablet, Rfl: 0  Immunization History   Administered Date(s) Administered     DTAP (<7y) 1999, 1999, 1999, 05/25/2000, 03/19/2004, 08/12/2010     HEPA 11/02/2007, 10/10/2008     HPV 08/12/2010, 08/18/2011, 03/16/2012     HepB 1999, 1999, 05/25/2000     HepB-Adult 07/28/2020     Hib (PRP-T) 1999     Influenza (IIV3) PF 11/04/2005, 11/16/2006, 11/02/2007, 10/06/2010, 10/22/2011, 01/08/2013     Influenza Intranasal Vaccine 10/10/2008     Influenza Vaccine IM > 6 months Valent IIV4 11/12/2013, 10/17/2014, 10/26/2015, 11/19/2016, 01/24/2018, 11/10/2019      Influenza Vaccine, 6+MO IM (QUADRIVALENT W/PRESERVATIVES) 10/20/2018     MMR 02/10/2000, 02/12/2001, 10/10/2008     Mantoux Tuberculin Skin Test 03/22/2016     Meningococcal (Menactra ) 06/14/2018     Meningococcal (Menomune ) 08/12/2010     Poliovirus, inactivated (IPV) 1999, 1999, 1999, 03/19/2004     TDAP Vaccine (Adacel) 07/14/2020     Tdap (Adacel,Boostrix) 08/12/2010     Varicella 02/10/2000, 10/10/2008     Allergies   Allergen Reactions     Penicillins Rash         EXAM:   /72 (BP Location: Left arm, Patient Position: Sitting, Cuff Size: Adult Large)   Pulse 104   Wt 74.4 kg (164 lb)   SpO2 96%   BMI 31.50 kg/m    GENERAL APPEARANCE: alert, cooperative and not in distress  SKIN: no rashes, no lesions  HEAD: atraumatic, normocephalic  EYES: lids and lashes normal, conjunctivae and sclerae clear, EOM full and intact  ENT: no scars or lesions, nasal exam showed no discharge, swelling or lesions noted, otoscopy showed external auditory canals clear, tympanic membranes normal, tongue midline and normal, soft palate, uvula, and tonsils normal  NECK: no asymmetry, masses, or scars, supple without significant adenopathy  LUNGS: unlabored respirations, no intercostal retractions or accessory muscle use, clear to auscultation without rales or wheezes  HEART: regular rate and rhythm without murmurs and normal S1 and S2  MUSCULOSKELETAL: no musculoskeletal defects are noted  NEURO: no focal deficits noted  PSYCH: does not appear depressed or anxious    WORKUP: None    ASSESSMENT/PLAN:  Sofy Stringer is a 20 year old female seen for evaluation of urticaria.    1. Urticaria - Sofy's symptoms began approximately 2 weeks ago. Her symptoms improved with prednisone however she experienced another flare once she completed the course of steroids. She has found that antihistamines have been helpful in terms of controlling her symptoms. There is no obvious trigger for her symptoms though  poor sleep and increased stress may be the cause.     - continue cetirizine 10mg daily - increase to twice daily if having break through symptoms  - continue with daily antihistamine until symptoms have resolved x 2 weeks, then may hold medication and monitor for return of symptoms  - follow-up if hives return or persist for more than 2 weeks      Thank you for allowing me to participate in the care of Sofy QUINCY Stringer.      Gregg Mills MD, FAAAAI  Allergy/Immunology  [facility]      Chart documentation done in part with Dragon Voice Recognition Software. Although reviewed after completion, some word and grammatical errors may remain.

## 2020-10-12 ENCOUNTER — TELEPHONE (OUTPATIENT)
Dept: FAMILY MEDICINE | Facility: CLINIC | Age: 20
End: 2020-10-12

## 2020-10-12 ENCOUNTER — NURSE TRIAGE (OUTPATIENT)
Dept: NURSING | Facility: CLINIC | Age: 20
End: 2020-10-12

## 2020-10-12 DIAGNOSIS — T88.9XXA: Primary | ICD-10-CM

## 2020-10-12 NOTE — TELEPHONE ENCOUNTER
Patient in classes for nursing school, and she needs to have Covid testing done before her clinicals, which start October 19.      Please call her at [phone] to speak to her about getting tested and if a test has been ordered, or if she needs to explore other options.      Routed to AN sol Long RN  [facility]        Reason for Disposition    [1] Caller requesting NON-URGENT health information AND [2] PCP's office is the best resource    Protocols used: INFORMATION ONLY CALL-A-AH

## 2020-10-12 NOTE — TELEPHONE ENCOUNTER
Patient in classes for nursing school, and she needs to have Covid testing done before her clinicals, which start October 19.      Please call her at [phone] to speak to her about getting tested and if a test has been ordered, or if she needs to explore other options.      Routed to AN sol Long RN  [facility]

## 2020-12-06 ENCOUNTER — HEALTH MAINTENANCE LETTER (OUTPATIENT)
Age: 20
End: 2020-12-06

## 2021-02-09 DIAGNOSIS — L70.0 ACNE VULGARIS: ICD-10-CM

## 2021-02-09 RX ORDER — BENZOYL PEROXIDE 50 MG/ML
LIQUID TOPICAL
Qty: 227 G | Refills: 2 | Status: SHIPPED | OUTPATIENT
Start: 2021-02-09 | End: 2021-05-14

## 2021-03-06 ENCOUNTER — IMMUNIZATION (OUTPATIENT)
Dept: FAMILY MEDICINE | Facility: CLINIC | Age: 21
End: 2021-03-06
Payer: COMMERCIAL

## 2021-03-06 PROCEDURE — 0001A PR COVID VAC PFIZER DIL RECON 30 MCG/0.3 ML IM: CPT

## 2021-03-06 PROCEDURE — 91300 PR COVID VAC PFIZER DIL RECON 30 MCG/0.3 ML IM: CPT

## 2021-03-18 ENCOUNTER — TELEPHONE (OUTPATIENT)
Dept: ALLERGY | Facility: CLINIC | Age: 21
End: 2021-03-18

## 2021-03-18 NOTE — LETTER
March 18, 2021      Sofy Stringer  [address] [address] [address]        To Whom It May Concern:    Sofy Stringer is a patient in our clinic. She should be allowed to wear a disposable surgical mask in place of a cloth mask. Double masking may be done at her discretion but should not be required. Please do not hesitate to contact me at the above number if there are any questions or concerns.      Sincerely,          Gregg Mills MD

## 2021-03-18 NOTE — TELEPHONE ENCOUNTER
Patient calling stating that her work is requiring that she wear the cloth mask that they will provide to her. Patient is not comfortable with this and is requesting a note from Dr. Mills to provide to her employer that she can wear her own disposable surgical mask at work. She states her employer also stated that if she would like to wear her own mask that she could wear the employer supplied mask over her own mask. Patient would also like letter stating that she does not need to double mask.    Tamika WILKINSON MA

## 2021-03-27 ENCOUNTER — IMMUNIZATION (OUTPATIENT)
Dept: FAMILY MEDICINE | Facility: CLINIC | Age: 21
End: 2021-03-27
Attending: FAMILY MEDICINE
Payer: COMMERCIAL

## 2021-03-27 PROCEDURE — 0002A PR COVID VAC PFIZER DIL RECON 30 MCG/0.3 ML IM: CPT

## 2021-03-27 PROCEDURE — 91300 PR COVID VAC PFIZER DIL RECON 30 MCG/0.3 ML IM: CPT

## 2021-04-13 ENCOUNTER — TRANSFERRED RECORDS (OUTPATIENT)
Dept: MULTI SPECIALTY CLINIC | Facility: CLINIC | Age: 21
End: 2021-04-13

## 2021-04-13 LAB — PAP-ABSTRACT: NORMAL

## 2021-06-02 ENCOUNTER — VIRTUAL VISIT (OUTPATIENT)
Dept: FAMILY MEDICINE | Facility: CLINIC | Age: 21
End: 2021-06-02
Payer: COMMERCIAL

## 2021-06-02 DIAGNOSIS — R41.840 ATTENTION DEFICIT: Primary | ICD-10-CM

## 2021-06-02 PROCEDURE — 99213 OFFICE O/P EST LOW 20 MIN: CPT | Mod: 95 | Performed by: FAMILY MEDICINE

## 2021-06-02 RX ORDER — DEXTROAMPHETAMINE SACCHARATE, AMPHETAMINE ASPARTATE MONOHYDRATE, DEXTROAMPHETAMINE SULFATE AND AMPHETAMINE SULFATE 2.5; 2.5; 2.5; 2.5 MG/1; MG/1; MG/1; MG/1
10 CAPSULE, EXTENDED RELEASE ORAL EVERY MORNING
Qty: 30 CAPSULE | Refills: 0 | Status: SHIPPED | OUTPATIENT
Start: 2021-06-02 | End: 2021-06-18

## 2021-06-02 NOTE — PATIENT INSTRUCTIONS
1. Try Adderall 10 mg in the morning - we have the option to increase the dose.    2. You will get a call from our mental health department for ADHD testing.

## 2021-06-02 NOTE — PROGRESS NOTES
Sofy is a 21 year old who is being evaluated via a billable video visit.      How would you like to obtain your AVS? MyChart  If the video visit is dropped, the invitation should be resent by: Text to cell phone: [phone]  Will anyone else be joining your video visit? No      Video Start Time: 2:38 PM    ADHD - present since childhood. Symptoms are inattention, lack of focus, easy distractibility poor memory, not finishing tasks. Affects performance at school. Has some anxiety but denies symptoms depression, nuzhat or hypomania. Denies use of excessive alcohol, recreational drugs.   Evaluation and treatment:    She wants to try Adderall which I approved.   If that works, she will get formally tested.   Counseling done today regarding tolerance, dependence, abuse with these medications.     Exam:    There were no vitals taken for this visit.    Gen: on video, healthy appearing female in no acute distress. Accompanied by mom, Sondra.    Assessment and Plan - Decision Making    1. Attention deficit    Per HPI    - amphetamine-dextroamphetamine (ADDERALL XR) 10 MG 24 hr capsule; Take 1 capsule (10 mg) by mouth every morning  Dispense: 30 capsule; Refill: 0  - MENTAL HEALTH REFERRAL  - Adult; Assessments and Testing; General Psychological Testing; [facility] [place]: [facility] [phone]; We will contact you to schedule the appointment or please call with any questions      Written instructions given as follows:    Patient Instructions   1. Try Adderall 10 mg in the morning - we have the option to increase the dose.    2. You will get a call from our mental health department for ADHD testing.                  Video-Visit Details    Type of service:  Video Visit    Video End Time: 2:48 PM    Originating Location (pt. Location): Home    Distant Location (provider location):  [facility] [employer]     Platform used for Video Visit: [employer]

## 2021-06-09 ENCOUNTER — OFFICE VISIT (OUTPATIENT)
Dept: URGENT CARE | Facility: URGENT CARE | Age: 21
End: 2021-06-09
Payer: COMMERCIAL

## 2021-06-09 VITALS
OXYGEN SATURATION: 100 % | DIASTOLIC BLOOD PRESSURE: 78 MMHG | BODY MASS INDEX: 31.35 KG/M2 | SYSTOLIC BLOOD PRESSURE: 121 MMHG | TEMPERATURE: 99.4 F | HEART RATE: 69 BPM | WEIGHT: 163.2 LBS

## 2021-06-09 DIAGNOSIS — R04.0 EPISTAXIS: Primary | ICD-10-CM

## 2021-06-09 DIAGNOSIS — R63.0 LACK OF APPETITE: ICD-10-CM

## 2021-06-09 LAB
ERYTHROCYTE [DISTWIDTH] IN BLOOD BY AUTOMATED COUNT: 13.9 % (ref 10–15)
HCT VFR BLD AUTO: 37.1 % (ref 35–47)
HGB BLD-MCNC: 11.4 G/DL (ref 11.7–15.7)
MCH RBC QN AUTO: 27.4 PG (ref 26.5–33)
MCHC RBC AUTO-ENTMCNC: 30.7 G/DL (ref 31.5–36.5)
MCV RBC AUTO: 89 FL (ref 78–100)
PLATELET # BLD AUTO: 329 10E9/L (ref 150–450)
RBC # BLD AUTO: 4.16 10E12/L (ref 3.8–5.2)
WBC # BLD AUTO: 6.5 10E9/L (ref 4–11)

## 2021-06-09 PROCEDURE — 36415 COLL VENOUS BLD VENIPUNCTURE: CPT | Performed by: FAMILY MEDICINE

## 2021-06-09 PROCEDURE — 99213 OFFICE O/P EST LOW 20 MIN: CPT | Performed by: FAMILY MEDICINE

## 2021-06-09 PROCEDURE — 85027 COMPLETE CBC AUTOMATED: CPT | Performed by: FAMILY MEDICINE

## 2021-06-09 NOTE — PROGRESS NOTES
Chief complaint: nose bleeds    3 weeks ago had lasik surgery    Since then has been getting nosebleeds 1-3 nosebleeds a day    Since surgery she has been staying home all the time.  Mom noticed she had decreased appetite the past couple of days.    No fevers or chills chest pain or shortness of breath  Denies any covid symptoms  Denies any loss of taste or smell     Allergies   Allergen Reactions     Penicillins Rash       No past medical history on file.    amphetamine-dextroamphetamine (ADDERALL XR) 10 MG 24 hr capsule, Take 1 capsule (10 mg) by mouth every morning  BENZOYL PEROXIDE WASH 5 % external liquid, USE AS DIRECTED TO WASH FACE DAILY  Bioflavonoid Products (VITAMIN C) CHEW, Take 2 chew tab by mouth daily  cetirizine (ZYRTEC) 10 MG tablet, Take 10 mg by mouth daily  diphenhydrAMINE (BENADRYL) 25 MG tablet, Take 50 mg by mouth every 6 hours as needed for itching or allergies  Iron Combinations (IRON COMPLEX) CAPS, Take 1 capsule by mouth daily  MULTI VIT/FL OR, None Entered  ondansetron (ZOFRAN) 4 MG tablet, Take 1 tablet (4 mg) by mouth every 8 hours as needed for nausea  Probiotic Product (PRO-BIOTIC BLEND) CAPS, Take 1 capsule by mouth daily Reported on 5/11/2017  oxyCODONE (ROXICODONE) 5 MG tablet, Take 1 tablet (5 mg) by mouth every 6 hours as needed for pain (Patient not taking: Reported on 10/5/2020)    No current facility-administered medications on file prior to visit.       Social History     Tobacco Use     Smoking status: Never Smoker     Smokeless tobacco: Never Used     Tobacco comment: Lives in smoke free household   Substance Use Topics     Alcohol use: No     Drug use: No       ROS:  review of systems negative except for noted above.       OBJECTIVE:  /78   Pulse 69   Temp 99.4  F (37.4  C) (Tympanic)   Wt 74 kg (163 lb 3.2 oz)   SpO2 100%   BMI 31.35 kg/m     General:   awake, alert, and cooperative.  NAD.   Head: Normocephalic, atraumatic.  Eyes: Conjunctiva clear,  ENT:  superficial blood vessels noted on right kisselbach's area   Neuro: Alert and oriented - normal speech.  MS: Using extremities freely  PSYCH:  Normal affect, normal speech  SKIN: no obvious rashes    ASSESSMENT:    ICD-10-CM    1. Epistaxis  R04.0 OTOLARYNGOLOGY REFERRAL     CBC with platelets   2. Lack of appetite  R63.0 CBC with platelets       PLAN:   Supportive management of epistaxis  Advised flonase to help manage her allergies  Referred to ENT for follow up if continues to be recurrent  Cbc normal - except for mild anemia. Recommended follow up with primary care provider   Lack of appetite for 2 days. Patient has not had much activity lately.  Follow up with primary care provider and discuss further if symptoms persist    Signs or symptoms of covid reviewed if present recommend covid testing. Patient declined       Advised about symptoms which might herald more serious problems.        Isabelle Rader MD

## 2021-06-16 ENCOUNTER — TELEPHONE (OUTPATIENT)
Dept: FAMILY MEDICINE | Facility: CLINIC | Age: 21
End: 2021-06-16

## 2021-06-16 NOTE — TELEPHONE ENCOUNTER
Left message on answering machine for patient to call back to [phone].  Lakeisha Hernandez BSN, RN

## 2021-06-16 NOTE — TELEPHONE ENCOUNTER
"Can a [facility] RN get a hold of this pt?   Call was being warm transferred to me but dropped. I left message on voicemail for her to call her clinic and be triaged- if she has any shortness of breath, she should go to the ER>  Per phone transfer, pt has had 2 weeks of \"heart thumping followed by pain\".  Thanks!     Gini Pizano RN    "

## 2021-06-18 ENCOUNTER — OFFICE VISIT (OUTPATIENT)
Dept: FAMILY MEDICINE | Facility: CLINIC | Age: 21
End: 2021-06-18
Payer: COMMERCIAL

## 2021-06-18 VITALS
SYSTOLIC BLOOD PRESSURE: 117 MMHG | DIASTOLIC BLOOD PRESSURE: 82 MMHG | HEART RATE: 97 BPM | WEIGHT: 166.38 LBS | TEMPERATURE: 97.8 F | BODY MASS INDEX: 27.72 KG/M2 | HEIGHT: 65 IN

## 2021-06-18 DIAGNOSIS — Z11.9 SCREENING EXAMINATION FOR INFECTIOUS DISEASE: ICD-10-CM

## 2021-06-18 DIAGNOSIS — R00.2 PALPITATIONS: ICD-10-CM

## 2021-06-18 DIAGNOSIS — R04.0 EPISTAXIS: Primary | ICD-10-CM

## 2021-06-18 DIAGNOSIS — R07.9 CHEST PAIN, UNSPECIFIED TYPE: ICD-10-CM

## 2021-06-18 DIAGNOSIS — R53.83 FATIGUE, UNSPECIFIED TYPE: ICD-10-CM

## 2021-06-18 LAB
ALBUMIN SERPL-MCNC: 3.5 G/DL (ref 3.4–5)
ALP SERPL-CCNC: 74 U/L (ref 40–150)
ALT SERPL W P-5'-P-CCNC: 24 U/L (ref 0–50)
ANION GAP SERPL CALCULATED.3IONS-SCNC: 4 MMOL/L (ref 3–14)
AST SERPL W P-5'-P-CCNC: 12 U/L (ref 0–45)
BILIRUB SERPL-MCNC: 0.2 MG/DL (ref 0.2–1.3)
BUN SERPL-MCNC: 10 MG/DL (ref 7–30)
CALCIUM SERPL-MCNC: 8.8 MG/DL (ref 8.5–10.1)
CHLORIDE SERPL-SCNC: 105 MMOL/L (ref 94–109)
CO2 SERPL-SCNC: 28 MMOL/L (ref 20–32)
CREAT SERPL-MCNC: 0.64 MG/DL (ref 0.52–1.04)
GFR SERPL CREATININE-BSD FRML MDRD: >90 ML/MIN/{1.73_M2}
GLUCOSE SERPL-MCNC: 81 MG/DL (ref 70–99)
POTASSIUM SERPL-SCNC: 4.4 MMOL/L (ref 3.4–5.3)
PROT SERPL-MCNC: 7.4 G/DL (ref 6.8–8.8)
SODIUM SERPL-SCNC: 137 MMOL/L (ref 133–144)
TSH SERPL DL<=0.005 MIU/L-ACNC: 2.17 MU/L (ref 0.4–4)

## 2021-06-18 PROCEDURE — 80053 COMPREHEN METABOLIC PANEL: CPT | Performed by: FAMILY MEDICINE

## 2021-06-18 PROCEDURE — 99214 OFFICE O/P EST MOD 30 MIN: CPT | Performed by: FAMILY MEDICINE

## 2021-06-18 PROCEDURE — 86481 TB AG RESPONSE T-CELL SUSP: CPT | Performed by: FAMILY MEDICINE

## 2021-06-18 PROCEDURE — 84443 ASSAY THYROID STIM HORMONE: CPT | Performed by: FAMILY MEDICINE

## 2021-06-18 PROCEDURE — 82306 VITAMIN D 25 HYDROXY: CPT | Performed by: FAMILY MEDICINE

## 2021-06-18 PROCEDURE — 36415 COLL VENOUS BLD VENIPUNCTURE: CPT | Performed by: FAMILY MEDICINE

## 2021-06-18 ASSESSMENT — MIFFLIN-ST. JEOR: SCORE: 1515.55

## 2021-06-18 NOTE — PATIENT INSTRUCTIONS
We will send you lab results    See ENT for nosebleeds    Stay on flonase for now    Could use plain saline nasal spray    Cardiology for palpitations

## 2021-06-18 NOTE — PROGRESS NOTES
"         Yazmin Goetz is a 21 year old who presents for the following health issues     HPI     Chest pain off and on for the past couple weeks between the breast bone area but does not happen with food or physical activity.   Nose bleeds 3 times daily since having eye surgery on 05/21/2021         Review of Systems   lasik 5 weeks ago both  Eyes  [facility]    Overall good result vision wise    Almost never had nosebleeds in last 10 years    2-3 nosebleeds daily, could last 10 minutes other only 2 minutes    Nonsmoker    No hi chol  No  htn      No diabetes    A little tired        Right side of nose    Heart jumps/ jolts  Then a stabbing pain   Random, lasts up to  2-8 minutes    No trigger     Not short of breath    No sweating    Heart goes fast before this      No history of heart problems    Runs low on hemoglobin       No wt change      Mild fatigue  1-2 x  Per  Day for chest symptom s        Objective    /82 (BP Location: Left arm, Patient Position: Chair, Cuff Size: Adult Regular)   Pulse 97   Temp 97.8  F (36.6  C) (Temporal)   Ht 1.651 m (5' 5\")   Wt 75.5 kg (166 lb 6 oz)   Breastfeeding No   BMI 27.69 kg/m    Body mass index is 27.69 kg/m .  Physical Exam  Constitutional:       Appearance: She is well-developed.   HENT:      Head: Normocephalic and atraumatic.      Right Ear: Tympanic membrane, ear canal and external ear normal.      Left Ear: Tympanic membrane, ear canal and external ear normal.      Mouth/Throat:      Mouth: Mucous membranes are moist.      Pharynx: Oropharynx is clear.   Eyes:      Conjunctiva/sclera: Conjunctivae normal.   Neck:      Vascular: No carotid bruit.   Cardiovascular:      Rate and Rhythm: Normal rate and regular rhythm.      Heart sounds: Normal heart sounds.   Pulmonary:      Effort: Pulmonary effort is normal. No respiratory distress.      Breath sounds: Normal breath sounds.   Abdominal:      Palpations: Abdomen is soft.      " Tenderness: There is no abdominal tenderness. There is no right CVA tenderness, left CVA tenderness, guarding or rebound.   Neurological:      Mental Status: She is alert and oriented to person, place, and time.      left nasal mucosa normal  Right nasal mucosa irritated/ splotchy red medially  No active bleeding or hematoma present    No sinus/ submandib tenderness       ASSESSMENT / PLAN:  (R04.0) Epistaxis  (primary encounter diagnosis)  Comment: likely a culprit vessel in right nasal area  Plan: OTOLARYNGOLOGY REFERRAL        They will schedule ENT consult. Likely needs cauterization.  Continue flonase for now.  Could use plain nasal saline.    (R00.2) Palpitations  Comment: check labs but then likely have patient see cardiology.  May need some sort of monitor.   Plan: CARDIOLOGY EVAL ADULT REFERRAL             (R53.83) Fatigue, unspecified type  Comment: check labs   Plan: TSH with free T4 reflex, Comprehensive         metabolic panel, Vitamin D Deficiency             (R07.9) Chest pain, unspecified type  Comment: as above.  Pain does not sound anginal.  Rather, occurs after palpitations.   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    Quentin Salamanca MD

## 2021-06-20 LAB
GAMMA INTERFERON BACKGROUND BLD IA-ACNC: 0 IU/ML
M TB IFN-G CD4+ BCKGRND COR BLD-ACNC: 10 IU/ML
M TB TUBERC IFN-G BLD QL: NEGATIVE
MITOGEN IGNF BCKGRD COR BLD-ACNC: 0 IU/ML
MITOGEN IGNF BCKGRD COR BLD-ACNC: 0 IU/ML

## 2021-06-20 NOTE — RESULT ENCOUNTER NOTE
Vitamin D level is still pending.    The TB test is negative.  No evidence of tuberculosis.    Other results here are fine.    Quentin Salamanca MD

## 2021-06-21 ENCOUNTER — OFFICE VISIT (OUTPATIENT)
Dept: OTOLARYNGOLOGY | Facility: CLINIC | Age: 21
End: 2021-06-21
Payer: COMMERCIAL

## 2021-06-21 VITALS
HEART RATE: 90 BPM | WEIGHT: 165 LBS | BODY MASS INDEX: 27.46 KG/M2 | SYSTOLIC BLOOD PRESSURE: 111 MMHG | DIASTOLIC BLOOD PRESSURE: 80 MMHG

## 2021-06-21 DIAGNOSIS — R04.0 EPISTAXIS: Primary | ICD-10-CM

## 2021-06-21 LAB — DEPRECATED CALCIDIOL+CALCIFEROL SERPL-MC: 41 UG/L (ref 20–75)

## 2021-06-21 PROCEDURE — 30901 CONTROL OF NOSEBLEED: CPT | Mod: RT | Performed by: OTOLARYNGOLOGY

## 2021-06-21 PROCEDURE — 99203 OFFICE O/P NEW LOW 30 MIN: CPT | Mod: 25 | Performed by: OTOLARYNGOLOGY

## 2021-06-21 NOTE — LETTER
6/21/2021         RE: Sofy Stringer  [id number] [address] [place] [address] [id number]        Dear Colleague,    Thank you for referring your patient, Sofy Stringer, to the [facility]. Please see a copy of my visit note below.    History of Present Illness - Sofy Stringer is a very pleasant 21 year old female here to see me for the first time for nose bleeds.    About one month ago she started to get daily nosebleeds from the RIGHT side.  She does use Flonase qam.      There was no preceding nasal or facial trauma, no previous nasal surgery, no preceding increase in congestion, sinus infections, changes in vision, or headache.  She did have Lasik in May, and it seemed to start after that.      Otherwise no history of bleeding problems such as easy bruising, prolonged bleeding after minor procedures or dental work, menorrhagia, or chronic anemia.    Past Medical History -   Patient Active Problem List   Diagnosis     Obesity     Acne       Current Medications -   Current Outpatient Medications:      Bioflavonoid Products (VITAMIN C) CHEW, Take 2 chew tab by mouth daily, Disp: , Rfl:      cetirizine (ZYRTEC) 10 MG tablet, Take 10 mg by mouth daily, Disp: , Rfl:      diphenhydrAMINE (BENADRYL) 25 MG tablet, Take 50 mg by mouth every 6 hours as needed for itching or allergies, Disp: , Rfl:      Iron Combinations (IRON COMPLEX) CAPS, Take 1 capsule by mouth daily, Disp: , Rfl:      MULTI VIT/FL OR, None Entered, Disp: , Rfl:      ondansetron (ZOFRAN) 4 MG tablet, Take 1 tablet (4 mg) by mouth every 8 hours as needed for nausea, Disp: 6 tablet, Rfl: 0     Probiotic Product (PRO-BIOTIC BLEND) CAPS, Take 1 capsule by mouth daily Reported on 5/11/2017, Disp: , Rfl:     Allergies -   Allergies   Allergen Reactions     Penicillins Rash       Social History -   Social History     Socioeconomic History     Marital status: Single     Spouse name: Not on file     Number of children: Not on file      Years of education: Not on file     Highest education level: Not on file   Occupational History     Not on file   Social Needs     Financial resource strain: Not on file     Food insecurity     Worry: Not on file     Inability: Not on file     Transportation needs     Medical: Not on file     Non-medical: Not on file   Tobacco Use     Smoking status: Never Smoker     Smokeless tobacco: Never Used     Tobacco comment: Lives in smoke free household   Substance and Sexual Activity     Alcohol use: No     Drug use: No     Sexual activity: Never   Lifestyle     Physical activity     Days per week: Not on file     Minutes per session: Not on file     Stress: Not on file   Relationships     Social connections     Talks on phone: Not on file     Gets together: Not on file     Attends Orthodoxy service: Not on file     Active member of club or organization: Not on file     Attends meetings of clubs or organizations: Not on file     Relationship status: Not on file     Intimate partner violence     Fear of current or ex partner: Not on file     Emotionally abused: Not on file     Physically abused: Not on file     Forced sexual activity: Not on file   Other Topics Concern     Parent/sibling w/ CABG, MI or angioplasty before 65F 55M? No   Social History Narrative    ENVIRONMENTAL HISTORY: [place] The home is heated with a forced air and gas furnace. They do have central air conditioning. The patient's bedroom is furnished with carpeting in bedroom and fabric window coverings.  Pets inside the house include 1 dog(s). There is no history of cockroach or mice infestation. There is/are 1 smokers living in the house.  There is/are 0 who smoke ecigarettes/vape living in the house.The house does not have a damp basement.         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 She lives with her mother, grandmother, and brother.  Her mother is  an RN.       Family History -   Family History   Problem Relation Age of Onset     Thyroid Disease Father      Hypertension Maternal Grandmother      Lipids Maternal Grandmother      Skin Cancer Maternal Grandmother      Diabetes Maternal Grandmother      Hyperlipidemia Maternal Grandmother      Cancer - colorectal Maternal Grandfather      Cancer Maternal Grandfather      Colon Cancer Maternal Grandfather      Asthma Maternal Grandfather      Cerebrovascular Disease Other      Glaucoma No family hx of      Macular Degeneration No family hx of        Review of Systems - As per HPI and PMHx, otherwise 10+ system review of the head and neck, and general constitution is negative.    Physical Exam  B/P: Data Unavailable, T: Data Unavailable, P: Data Unavailable, R: Data Unavailable  Vitals: There were no vitals taken for this visit.  BMI= There is no height or weight on file to calculate BMI.    General - The patient is well nourished and well developed, and appears to have good nutritional status.  Alert and oriented to person and place, answers questions and cooperates with examination appropriately.   Head and Face - Normocephalic and atraumatic, with no gross asymmetry noted of the contour of the facial features.  The facial nerve is intact, with strong symmetric movements.  Voice and Breathing - The patient was breathing comfortably without the use of accessory muscles. There was no wheezing, stridor, or stertor.  The patients voice was clear and strong, and had appropriate pitch and quality.  Ears - The tympanic membranes are normal in appearance, bony landmarks are intact.  No retraction, perforation, or masses.  No fluid or purulence was seen in the external canal or the middle ear. No evidence of infection of the middle ear or external canal, cerumen was normal in appearance.  Eyes - Extraocular movements intact, and the pupils were reactive to light.  Sclera were not icteric or injected, conjunctiva were pink  and moist.      Nasal Cautery - Options were explained to the patient regarding conservative measures versus nasal cautery in the clinic today.  The patient wished to proceed with cautery.  I placed a small piece of cotton soaked in 4% liquid lidocaine in the RIGHT anterior nasal cavity over the area of prominent vessels.  This was left in place for 10 minutes.  I then proceeded to remove the cotton, and applied silver nitrate to the vessels, starting distally, and working my way back to the vessels  point of entry onto the nasal mucosa.  After completion, I placed a small piece of Surgicel over the area that was cauterized.  The patient tolerated the procedure well.      A/P - Sofy Stringer is a 21 year old female  (R04.0) Epistaxis  (primary encounter diagnosis)    The patient has been cauterized today for epistaxis.  I counseled them on keeping the head above the level of the heart at all times for the next week.  No picking or rubbing at the cauterized side of the nose, or blowing for 1 week.  The patient was counseled that in the event of another recurrence of bleeding, to call into my direct scheduling line so I can see them with 24 hours.      Again, thank you for allowing me to participate in the care of your patient.        Sincerely,        Leroy Holbrook MD

## 2021-06-21 NOTE — PROGRESS NOTES
History of Present Illness - Sofy Stringer is a very pleasant 21 year old female here to see me for the first time for nose bleeds.    About one month ago she started to get daily nosebleeds from the RIGHT side.  She does use Flonase qam.      There was no preceding nasal or facial trauma, no previous nasal surgery, no preceding increase in congestion, sinus infections, changes in vision, or headache.  She did have Lasik in May, and it seemed to start after that.      Otherwise no history of bleeding problems such as easy bruising, prolonged bleeding after minor procedures or dental work, menorrhagia, or chronic anemia.    Past Medical History -   Patient Active Problem List   Diagnosis     Obesity     Acne       Current Medications -   Current Outpatient Medications:      Bioflavonoid Products (VITAMIN C) CHEW, Take 2 chew tab by mouth daily, Disp: , Rfl:      cetirizine (ZYRTEC) 10 MG tablet, Take 10 mg by mouth daily, Disp: , Rfl:      diphenhydrAMINE (BENADRYL) 25 MG tablet, Take 50 mg by mouth every 6 hours as needed for itching or allergies, Disp: , Rfl:      Iron Combinations (IRON COMPLEX) CAPS, Take 1 capsule by mouth daily, Disp: , Rfl:      MULTI VIT/FL OR, None Entered, Disp: , Rfl:      ondansetron (ZOFRAN) 4 MG tablet, Take 1 tablet (4 mg) by mouth every 8 hours as needed for nausea, Disp: 6 tablet, Rfl: 0     Probiotic Product (PRO-BIOTIC BLEND) CAPS, Take 1 capsule by mouth daily Reported on 5/11/2017, Disp: , Rfl:     Allergies -   Allergies   Allergen Reactions     Penicillins Rash       Social History -   Social History     Socioeconomic History     Marital status: Single     Spouse name: Not on file     Number of children: Not on file     Years of education: Not on file     Highest education level: Not on file   Occupational History     Not on file   Social Needs     Financial resource strain: Not on file     Food insecurity     Worry: Not on file     Inability: Not on file     Transportation  needs     Medical: Not on file     Non-medical: Not on file   Tobacco Use     Smoking status: Never Smoker     Smokeless tobacco: Never Used     Tobacco comment: Lives in smoke free household   Substance and Sexual Activity     Alcohol use: No     Drug use: No     Sexual activity: Never   Lifestyle     Physical activity     Days per week: Not on file     Minutes per session: Not on file     Stress: Not on file   Relationships     Social connections     Talks on phone: Not on file     Gets together: Not on file     Attends Restorationist service: Not on file     Active member of club or organization: Not on file     Attends meetings of clubs or organizations: Not on file     Relationship status: Not on file     Intimate partner violence     Fear of current or ex partner: Not on file     Emotionally abused: Not on file     Physically abused: Not on file     Forced sexual activity: Not on file   Other Topics Concern     Parent/sibling w/ CABG, MI or angioplasty before 65F 55M? No   Social History Narrative    ENVIRONMENTAL HISTORY: [place] The home is heated with a forced air and gas furnace. They do have central air conditioning. The patient's bedroom is furnished with carpeting in bedroom and fabric window coverings.  Pets inside the house include 1 dog(s). There is no history of cockroach or mice infestation. There is/are 1 smokers living in the house.  There is/are 0 who smoke ecigarettes/vape living in the house.The house does not have a damp basement.         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 She lives with her mother, grandmother, and brother.  Her mother is an RN.       Family History -   Family History   Problem Relation Age of Onset     Thyroid Disease Father      Hypertension Maternal Grandmother      Lipids Maternal Grandmother      Skin Cancer Maternal Grandmother      Diabetes Maternal Grandmother       Hyperlipidemia Maternal Grandmother      Cancer - colorectal Maternal Grandfather      Cancer Maternal Grandfather      Colon Cancer Maternal Grandfather      Asthma Maternal Grandfather      Cerebrovascular Disease Other      Glaucoma No family hx of      Macular Degeneration No family hx of        Review of Systems - As per HPI and PMHx, otherwise 10+ system review of the head and neck, and general constitution is negative.    Physical Exam  /80   Pulse 90   Wt 74.8 kg (165 lb)   BMI 27.46 kg/m      General - The patient is well nourished and well developed, and appears to have good nutritional status.  Alert and oriented to person and place, answers questions and cooperates with examination appropriately.   Head and Face - Normocephalic and atraumatic, with no gross asymmetry noted of the contour of the facial features.  The facial nerve is intact, with strong symmetric movements.  Voice and Breathing - The patient was breathing comfortably without the use of accessory muscles. There was no wheezing, stridor, or stertor.  The patients voice was clear and strong, and had appropriate pitch and quality.  Ears - The tympanic membranes are normal in appearance, bony landmarks are intact.  No retraction, perforation, or masses.  No fluid or purulence was seen in the external canal or the middle ear. No evidence of infection of the middle ear or external canal, cerumen was normal in appearance.  Eyes - Extraocular movements intact, and the pupils were reactive to light.  Sclera were not icteric or injected, conjunctiva were pink and moist.      Nasal Cautery - Options were explained to the patient regarding conservative measures versus nasal cautery in the clinic today.  The patient wished to proceed with cautery.  I placed a small piece of cotton soaked in 4% liquid lidocaine in the RIGHT anterior nasal cavity over the area of prominent vessels.  This was left in place for 10 minutes.  I then proceeded to  remove the cotton, and applied silver nitrate to the vessels, starting distally, and working my way back to the vessels  point of entry onto the nasal mucosa.  After completion, I placed a small piece of Surgicel over the area that was cauterized.  The patient tolerated the procedure well.      A/P - Sofy Stringer is a 21 year old female  (R04.0) Epistaxis  (primary encounter diagnosis)    The patient has been cauterized today for epistaxis.  I counseled them on keeping the head above the level of the heart at all times for the next week.  No picking or rubbing at the cauterized side of the nose, or blowing for 1 week.  The patient was counseled that in the event of another recurrence of bleeding, to call into my direct scheduling line so I can see them with 24 hours.

## 2021-06-21 NOTE — ADDENDUM NOTE
Addended by: RODNEY DONNELLY on: 6/21/2021 12:58 PM     Modules accepted: Orders, Level of Service

## 2021-06-23 ENCOUNTER — MYC MEDICAL ADVICE (OUTPATIENT)
Dept: OTOLARYNGOLOGY | Facility: CLINIC | Age: 21
End: 2021-06-23

## 2021-06-24 NOTE — TELEPHONE ENCOUNTER
Bleeding started yesterday. Bleeding was heavy at first lasting about 5 minutes and the became spotty bleeding for about 10 more minutes. No bleeding today.    Please advise if any follow up needed for patient.     Patient would like a call with Dr. Holbrook's response at [phone].    Tamika WILKINSON MA

## 2021-06-24 NOTE — TELEPHONE ENCOUNTER
Called and left a detailed voicemail.  If it is very minor bleeding like that, then we still have a chance of the cautery sealing the vessel up.    If still bleeding through the weekend, I would add her on to Monday's clinic and we can cauterize again, no problem.

## 2021-06-24 NOTE — TELEPHONE ENCOUNTER
Tried calling patient again. Left message for return call. Cisco Sawyer CMA on 6/24/2021 at 1:02 PM

## 2021-06-24 NOTE — TELEPHONE ENCOUNTER
Message was sent on 6/23/2021, Dr. Holbrook's last visit note said he wanted to see patient within 24 hours if nose started bleeding again. Are you able to work patient in today if she is able to come to clinic? Natalie Dean RN, BSN [facility]

## 2021-06-24 NOTE — TELEPHONE ENCOUNTER
Per routing comment:  Leroy Holbrook MD Reiter-Miller, Crystal M RN; [facility] Special[facility] T[facility]   Caller: Unspecified (Yesterday, 12:22 PM)   Phone Number: [phone]             Please call patient.  If the bleeding is very short and minor, we still have a chance that the cautery site will still heal with enough scar tissue to seal it up.  But if the bleeding is signficant, 10-20 minutes of bleeding, then I will add her on and touch things up.  I could even do it today, if she is willing to wait a bit.      Attempted to reach patient by phone and no answer. Left message to return call to [phone] to let us know about her situation. Natalie Dean RN, BSN [facility]

## 2021-06-30 NOTE — PROGRESS NOTES
History of Present Illness - Sofy Stringer is a 21 year old female just seen on 6/21/21 for nose bleeds.  We performed cautery, and things were good for a few days, but at the end of last week she started to have nosebleeds again.  She is here for a pre planned procedure visit    Past medical history -   Patient Active Problem List   Diagnosis     Obesity     Acne       /76 (BP Location: Right arm, Patient Position: Sitting, Cuff Size: Adult Large)   Pulse 110   Wt 75.3 kg (166 lb)   SpO2 95%   BMI 27.62 kg/m      General - The patient is well nourished and well developed, and appears to have good nutritional status.  Alert and oriented to person and place, answers questions and cooperates with examination appropriately.   Head and Face - Normocephalic and atraumatic, with no gross asymmetry noted of the contour of the facial features.  The facial nerve is intact, with strong symmetric movements.  Eyes - Extraocular movements intact, and the pupils were reactive to light.  Sclera were not icteric or injected, conjunctiva were pink and moist.  Mouth - Examination of the oral cavity shows pink, healthy, moist mucosa.  No lesions or ulceration noted.  The dentition are in good repair.  The tongue is mobile and midline.    Nasal Cautery - Options were explained to the patient regarding conservative measures versus nasal cautery in the clinic today.  The patient wished to proceed with cautery.  I placed a small piece of cotton soaked in 4% liquid lidocaine in the RTanterior nasal cavity over the area of prominent vessels.  This was left in place for 10 minutes.  I then proceeded to remove the cotton, and applied silver nitrate to the vessels, starting distally, and working my way back to the vessels  point of entry onto the nasal mucosa.  After completion, I placed a small piece of Surgicel over the area that was cauterized.  The patient tolerated the procedure well.      A/P - Sofy Stringer  (R04.0)  Epistaxis  (primary encounter diagnosis)    The patient has been re-cauterized today for epistaxis.  I counseled them on keeping the head above the level of the heart at all times for the next week.  No picking or rubbing at the cauterized side of the nose, or blowing for 1 week.  The patient was counseled that in the event of another recurrence of bleeding, to call into my direct scheduling line so I can see them with 24 hours.

## 2021-07-01 ENCOUNTER — OFFICE VISIT (OUTPATIENT)
Dept: OTOLARYNGOLOGY | Facility: CLINIC | Age: 21
End: 2021-07-01
Payer: COMMERCIAL

## 2021-07-01 VITALS
SYSTOLIC BLOOD PRESSURE: 120 MMHG | OXYGEN SATURATION: 95 % | HEART RATE: 110 BPM | BODY MASS INDEX: 27.62 KG/M2 | DIASTOLIC BLOOD PRESSURE: 76 MMHG | WEIGHT: 166 LBS

## 2021-07-01 DIAGNOSIS — R04.0 EPISTAXIS: Primary | ICD-10-CM

## 2021-07-01 PROCEDURE — 30901 CONTROL OF NOSEBLEED: CPT | Mod: RT | Performed by: OTOLARYNGOLOGY

## 2021-07-01 NOTE — NURSING NOTE
"Chief Complaint   Patient presents with     Epistaxis     follow up after nose bleeds.        Vitals:    07/01/21 1138   BP: 120/76   BP Location: Right arm   Patient Position: Sitting   Cuff Size: Adult Large   Pulse: 110   SpO2: 95%   Weight: 75.3 kg (166 lb)     Wt Readings from Last 1 Encounters:   07/01/21 75.3 kg (166 lb)     Ht Readings from Last 1 Encounters:   06/18/21 1.651 m (5' 5\")       [facility], 7/1/2021 11:39 AM    "

## 2021-07-01 NOTE — LETTER
7/1/2021         RE: Sofy Stringer  [address] [address] [id number]        Dear Colleague,    Thank you for referring your patient, Sofy Stringer, to the [facility]. Please see a copy of my visit note below.    History of Present Illness - Sofy Stringer is a 21 year old female just seen on 6/21/21 for nose bleeds.  We performed cautery, and things were good for a few days, but at the end of last week she started to have nosebleeds again.  She is here for a pre planned procedure visit    Past medical history -   Patient Active Problem List   Diagnosis     Obesity     Acne       /76 (BP Location: Right arm, Patient Position: Sitting, Cuff Size: Adult Large)   Pulse 110   Wt 75.3 kg (166 lb)   SpO2 95%   BMI 27.62 kg/m      General - The patient is well nourished and well developed, and appears to have good nutritional status.  Alert and oriented to person and place, answers questions and cooperates with examination appropriately.   Head and Face - Normocephalic and atraumatic, with no gross asymmetry noted of the contour of the facial features.  The facial nerve is intact, with strong symmetric movements.  Eyes - Extraocular movements intact, and the pupils were reactive to light.  Sclera were not icteric or injected, conjunctiva were pink and moist.  Mouth - Examination of the oral cavity shows pink, healthy, moist mucosa.  No lesions or ulceration noted.  The dentition are in good repair.  The tongue is mobile and midline.    Nasal Cautery - Options were explained to the patient regarding conservative measures versus nasal cautery in the clinic today.  The patient wished to proceed with cautery.  I placed a small piece of cotton soaked in 4% liquid lidocaine in the RTanterior nasal cavity over the area of prominent vessels.  This was left in place for 10 minutes.  I then proceeded to remove the cotton, and applied silver nitrate to the vessels, starting  distally, and working my way back to the vessels  point of entry onto the nasal mucosa.  After completion, I placed a small piece of Surgicel over the area that was cauterized.  The patient tolerated the procedure well.      A/P - Sofy L West  (R04.0) Epistaxis  (primary encounter diagnosis)    The patient has been re-cauterized today for epistaxis.  I counseled them on keeping the head above the level of the heart at all times for the next week.  No picking or rubbing at the cauterized side of the nose, or blowing for 1 week.  The patient was counseled that in the event of another recurrence of bleeding, to call into my direct scheduling line so I can see them with 24 hours.      Again, thank you for allowing me to participate in the care of your patient.        Sincerely,        Leroy Holbrook MD

## 2021-07-18 ENCOUNTER — APPOINTMENT (OUTPATIENT)
Dept: GENERAL RADIOLOGY | Facility: CLINIC | Age: 21
End: 2021-07-18
Attending: EMERGENCY MEDICINE
Payer: COMMERCIAL

## 2021-07-18 ENCOUNTER — HOSPITAL ENCOUNTER (EMERGENCY)
Facility: CLINIC | Age: 21
Discharge: HOME | End: 2021-07-18
Attending: EMERGENCY MEDICINE | Admitting: EMERGENCY MEDICINE
Payer: COMMERCIAL

## 2021-07-18 VITALS
HEART RATE: 80 BPM | SYSTOLIC BLOOD PRESSURE: 113 MMHG | BODY MASS INDEX: 26.63 KG/M2 | OXYGEN SATURATION: 99 % | DIASTOLIC BLOOD PRESSURE: 63 MMHG | RESPIRATION RATE: 16 BRPM | WEIGHT: 160 LBS | TEMPERATURE: 98.9 F

## 2021-07-18 DIAGNOSIS — R07.9 CHEST PAIN, UNSPECIFIED TYPE: ICD-10-CM

## 2021-07-18 LAB
ALBUMIN SERPL-MCNC: 3.2 G/DL (ref 3.4–5)
ALP SERPL-CCNC: 65 U/L (ref 40–150)
ALT SERPL W P-5'-P-CCNC: 19 U/L (ref 0–50)
ANION GAP SERPL CALCULATED.3IONS-SCNC: 6 MMOL/L (ref 3–14)
AST SERPL W P-5'-P-CCNC: 19 U/L (ref 0–45)
BASOPHILS # BLD AUTO: 0 10E3/UL (ref 0–0.2)
BASOPHILS NFR BLD AUTO: 0 %
BILIRUB DIRECT SERPL-MCNC: <0.1 MG/DL (ref 0–0.2)
BILIRUB SERPL-MCNC: 0.2 MG/DL (ref 0.2–1.3)
BUN SERPL-MCNC: 9 MG/DL (ref 7–30)
CALCIUM SERPL-MCNC: 8.1 MG/DL (ref 8.5–10.1)
CHLORIDE BLD-SCNC: 110 MMOL/L (ref 94–109)
CO2 SERPL-SCNC: 25 MMOL/L (ref 20–32)
CREAT SERPL-MCNC: 0.66 MG/DL (ref 0.52–1.04)
EOSINOPHIL # BLD AUTO: 0.2 10E3/UL (ref 0–0.7)
EOSINOPHIL NFR BLD AUTO: 3 %
ERYTHROCYTE [DISTWIDTH] IN BLOOD BY AUTOMATED COUNT: 13.7 % (ref 10–15)
GFR SERPL CREATININE-BSD FRML MDRD: >90 ML/MIN/1.73M2
GLUCOSE BLD-MCNC: 90 MG/DL (ref 70–99)
HCG SERPL QL: NEGATIVE
HCT VFR BLD AUTO: 36.7 % (ref 35–47)
HGB BLD-MCNC: 11.7 G/DL (ref 11.7–15.7)
HOLD SPECIMEN: NORMAL
IMM GRANULOCYTES # BLD: 0 10E3/UL
IMM GRANULOCYTES NFR BLD: 0 %
LYMPHOCYTES # BLD AUTO: 2.9 10E3/UL (ref 0.8–5.3)
LYMPHOCYTES NFR BLD AUTO: 41 %
MCH RBC QN AUTO: 28.2 PG (ref 26.5–33)
MCHC RBC AUTO-ENTMCNC: 31.9 G/DL (ref 31.5–36.5)
MCV RBC AUTO: 88 FL (ref 78–100)
MONOCYTES # BLD AUTO: 0.5 10E3/UL (ref 0–1.3)
MONOCYTES NFR BLD AUTO: 7 %
NEUTROPHILS # BLD AUTO: 3.5 10E3/UL (ref 1.6–8.3)
NEUTROPHILS NFR BLD AUTO: 49 %
NRBC # BLD AUTO: 0 10E3/UL
NRBC BLD AUTO-RTO: 0 /100
PLATELET # BLD AUTO: 345 10E3/UL (ref 150–450)
POTASSIUM BLD-SCNC: 4.2 MMOL/L (ref 3.4–5.3)
PROT SERPL-MCNC: 7.2 G/DL (ref 6.8–8.8)
RBC # BLD AUTO: 4.15 10E6/UL (ref 3.8–5.2)
SODIUM SERPL-SCNC: 141 MMOL/L (ref 133–144)
TROPONIN I SERPL-MCNC: <0.015 UG/L (ref 0–0.04)
WBC # BLD AUTO: 7.1 10E3/UL (ref 4–11)

## 2021-07-18 PROCEDURE — 99285 EMERGENCY DEPT VISIT HI MDM: CPT | Mod: 25 | Performed by: EMERGENCY MEDICINE

## 2021-07-18 PROCEDURE — 84484 ASSAY OF TROPONIN QUANT: CPT | Performed by: EMERGENCY MEDICINE

## 2021-07-18 PROCEDURE — 36592 COLLECT BLOOD FROM PICC: CPT | Performed by: EMERGENCY MEDICINE

## 2021-07-18 PROCEDURE — 84703 CHORIONIC GONADOTROPIN ASSAY: CPT | Performed by: EMERGENCY MEDICINE

## 2021-07-18 PROCEDURE — 82248 BILIRUBIN DIRECT: CPT | Performed by: EMERGENCY MEDICINE

## 2021-07-18 PROCEDURE — 80048 BASIC METABOLIC PNL TOTAL CA: CPT | Performed by: EMERGENCY MEDICINE

## 2021-07-18 PROCEDURE — 93010 ELECTROCARDIOGRAM REPORT: CPT | Performed by: EMERGENCY MEDICINE

## 2021-07-18 PROCEDURE — 85004 AUTOMATED DIFF WBC COUNT: CPT | Performed by: EMERGENCY MEDICINE

## 2021-07-18 PROCEDURE — 71046 X-RAY EXAM CHEST 2 VIEWS: CPT

## 2021-07-18 PROCEDURE — 93005 ELECTROCARDIOGRAM TRACING: CPT | Performed by: EMERGENCY MEDICINE

## 2021-07-18 PROCEDURE — 36415 COLL VENOUS BLD VENIPUNCTURE: CPT | Performed by: EMERGENCY MEDICINE

## 2021-07-19 NOTE — DISCHARGE INSTRUCTIONS
Return if symptoms worsen or new symptoms develop.  Follow-up with primary care physician next available.  Drink plenty of fluids.  Take ibuprofen or Tylenol for pain wear Holter monitor as directed.  If any worsening pain shortness of breath weakness or other symptoms present please return for further evaluation and care.

## 2021-07-19 NOTE — ED PROVIDER NOTES
History     Chief Complaint   Patient presents with     Chest Pain     chest pain on and off for past two months, has appointment with cardiology in mid August after referal from family practice, this past week has been getting worse and more frequent denies SOB.         Sofy Stringer is a 21 year old with no significant past medical history who presents the emergency department complaining of chest pain. Patient has had intermittent chest pain for the past 2 months. She states symptoms fth a past have become more frequently she describes it as a stabbing pressure that is centrally located in her chest. She is not having any pain right now on a ride from her home to the emergency department it lasted for about 20 minutes. She rates it about a 4-6 out of 10 usually. With the pain she is a bit short of breath but this resolved resolves when the pain resolves. She is not had any nausea vomiting or diarrhea denies any abdominal pain has not any back pain. She denies any focal numbness weakness in extremity this has not had any bowel or bladder dysfunction. She has no history of hypertension hypercholesterolemia, diabetes smoking but does have a family cardiac history. She describes no change with back activity deep breathing or eating. This comes on at all times doing different things.    Allergies:  Allergies   Allergen Reactions     Penicillins Rash       Problem List:    Patient Active Problem List    Diagnosis Date Noted     Obesity 01/28/2014     Priority: Medium     Acne 01/28/2014     Priority: Medium        Past Medical History:    No past medical history on file.    Past Surgical History:    Past Surgical History:   Procedure Laterality Date     TONSILLECTOMY & ADENOIDECTOMY  2002       Family History:    Family History   Problem Relation Age of Onset     Thyroid Disease Father      Hypertension Maternal Grandmother      Lipids Maternal Grandmother      Skin Cancer Maternal Grandmother      Diabetes  Maternal Grandmother      Hyperlipidemia Maternal Grandmother      Cancer - colorectal Maternal Grandfather      Cancer Maternal Grandfather      Colon Cancer Maternal Grandfather      Asthma Maternal Grandfather      Cerebrovascular Disease Other      Glaucoma No family hx of      Macular Degeneration No family hx of        Social History:  Marital Status:  Single [1]  Social History     Tobacco Use     Smoking status: Never Smoker     Smokeless tobacco: Never Used     Tobacco comment: Lives in smoke free household   Substance Use Topics     Alcohol use: No     Drug use: No        Medications:    Bioflavonoid Products (VITAMIN C) CHEW  cetirizine (ZYRTEC) 10 MG tablet  diphenhydrAMINE (BENADRYL) 25 MG tablet  Iron Combinations (IRON COMPLEX) CAPS  MULTI VIT/FL OR  ondansetron (ZOFRAN) 4 MG tablet  Probiotic Product (PRO-BIOTIC BLEND) CAPS          Review of Systems  All systems reviewed and other than pertinent positive and negatives in HPI all other systems is negative.  Physical Exam   BP: 139/88  Pulse: 80  Temp: 99.1  F (37.3  C)  Resp: 16  Weight: 72.6 kg (160 lb)  SpO2: 99 %    Physical Exam  Vitals and nursing note reviewed.   Constitutional:       General: She is not in acute distress.     Appearance: Normal appearance. She is not ill-appearing, toxic-appearing or diaphoretic.   HENT:      Head: Normocephalic.      Nose: Nose normal.      Mouth/Throat:      Mouth: Mucous membranes are moist.      Pharynx: Oropharynx is clear.   Eyes:      Conjunctiva/sclera: Conjunctivae normal.   Cardiovascular:      Rate and Rhythm: Normal rate and regular rhythm.      Pulses: Normal pulses.      Heart sounds: Normal heart sounds. No murmur heard.     Pulmonary:      Effort: Pulmonary effort is normal.      Breath sounds: Normal breath sounds. No wheezing, rhonchi or rales.      Comments: Mild L mid sternal tenderness no erythema or edema  Abdominal:      General: Abdomen is flat. Bowel sounds are normal. There is no  distension.      Palpations: Abdomen is soft.      Tenderness: There is no abdominal tenderness. There is no right CVA tenderness or left CVA tenderness.   Musculoskeletal:         General: No swelling or tenderness. Normal range of motion.      Cervical back: Normal range of motion and neck supple.      Right lower leg: No edema.      Left lower leg: No edema.   Skin:     General: Skin is warm and dry.      Findings: No rash.   Neurological:      General: No focal deficit present.      Mental Status: She is alert and oriented to person, place, and time.      Sensory: No sensory deficit.      Motor: No weakness.      Coordination: Coordination normal.   Psychiatric:         Mood and Affect: Mood normal.         ED Course        Procedures              EKG Interpretation:      Interpreted by Hemanth Matson MD  Rhythm: normal sinus   Rate: normal  Axis: normal  Ectopy: none  Conduction: normal  ST Segments/ T Waves: No ST-T wave changes  Q Waves: none  Comparison to prior: No old EKG available    Clinical Impression: normal EKG          Critical Care time:  none               Results for orders placed or performed during the hospital encounter of 07/18/21 (from the past 24 hour(s))   [place] Draw    Narrative    The following orders were created for panel order [place] Draw.  Procedure                               Abnormality         Status                     ---------                               -----------         ------                     Extra Blue Top Tube[[id number]]                              In process                 Extra Red Top Tube[[id number]]                               In process                 Extra Green Top (Lithium...[[id number]]                      In process                 Extra Purple Top Tube[[id number]]                            In process                   Please view results for these tests on the individual orders.   CBC with platelets, differential    Narrative    The  following orders were created for panel order CBC with platelets, differential.  Procedure                               Abnormality         Status                     ---------                               -----------         ------                     CBC with platelets and d...[[id number]]                      Final result                 Please view results for these tests on the individual orders.   Basic metabolic panel   Result Value Ref Range    Sodium 141 133 - 144 mmol/L    Potassium 4.2 3.4 - 5.3 mmol/L    Chloride 110 (H) 94 - 109 mmol/L    Carbon Dioxide (CO2)      Anion Gap      Urea Nitrogen      Creatinine      Calcium      Glucose      GFR Estimate     CBC with platelets and differential   Result Value Ref Range    WBC Count 7.1 4.0 - 11.0 10e3/uL    RBC Count 4.15 3.80 - 5.20 10e6/uL    Hemoglobin 11.7 11.7 - 15.7 g/dL    Hematocrit 36.7 35.0 - 47.0 %    MCV 88 78 - 100 fL    MCH 28.2 26.5 - 33.0 pg    MCHC 31.9 31.5 - 36.5 g/dL    RDW 13.7 10.0 - 15.0 %    Platelet Count 345 150 - 450 10e3/uL    % Neutrophils 49 %    % Lymphocytes 41 %    % Monocytes 7 %    % Eosinophils 3 %    % Basophils 0 %    % Immature Granulocytes 0 %    NRBCs per 100 WBC 0 <1 /100    Absolute Neutrophils 3.5 1.6 - 8.3 10e3/uL    Absolute Lymphocytes 2.9 0.8 - 5.3 10e3/uL    Absolute Monocytes 0.5 0.0 - 1.3 10e3/uL    Absolute Eosinophils 0.2 0.0 - 0.7 10e3/uL    Absolute Basophils 0.0 0.0 - 0.2 10e3/uL    Absolute Immature Granulocytes 0.0 <=0.0 10e3/uL    Absolute NRBCs 0.0 10e3/uL       Medications - No data to display  Results for orders placed or performed during the hospital encounter of 07/18/21   Chest XR,  PA & LAT    Narrative    XR CHEST TWO VIEWS   7/18/2021 7:24 PM     HISTORY: Chest pain    COMPARISON: Chest x-ray 12/8/2015.      Impression    IMPRESSION: PA and lateral views of the chest. Lungs are clear. Heart  is normal in size. No effusions are evident. No pneumothorax.    KENROY DOHERTY MD         SYSTEM ID:   [id number]       Assessments & Plan (with Medical Decision Making)records were reviewed . Labs were obtained. EKG was normal. CBC was within normal limits. Basic metabolic panel without significant abnormality. Hepatic panel. Negative. prenancy test was negative.troponin within normal limits. CXR was unremarkable. Patient was observed for some time and had no arrhythmias noted.patient had no chest pain while in the ED. Findings discussed with patient and mother. This pain has been going on for several months with normal ekg and troponin. She has no cardiac risk factors. I do not think this is ACS.  I am going to place the patient on a holter monitor and have Patient follow up with her primary care physician later this week for further evaluation and care . Patient is in agreement with this plan.     I have reviewed the nursing notes.    I have reviewed the findings, diagnosis, plan and need for follow up with the patient.       Discharge Medication List as of 7/18/2021  9:52 PM          Final diagnoses:   Chest pain, unspecified type       7/18/2021   [facility] EMERGENCY DEPT     Hemanth Matson MD  07/20/21 0873

## 2021-07-22 ENCOUNTER — OFFICE VISIT (OUTPATIENT)
Dept: CARDIOLOGY | Facility: CLINIC | Age: 21
End: 2021-07-22
Payer: COMMERCIAL

## 2021-07-22 ENCOUNTER — ANCILLARY PROCEDURE (OUTPATIENT)
Dept: CARDIOLOGY | Facility: CLINIC | Age: 21
End: 2021-07-22
Attending: INTERNAL MEDICINE
Payer: COMMERCIAL

## 2021-07-22 VITALS
DIASTOLIC BLOOD PRESSURE: 82 MMHG | SYSTOLIC BLOOD PRESSURE: 128 MMHG | BODY MASS INDEX: 27.96 KG/M2 | HEART RATE: 97 BPM | OXYGEN SATURATION: 95 % | WEIGHT: 168 LBS

## 2021-07-22 DIAGNOSIS — R07.9 CHEST PAIN, UNSPECIFIED TYPE: ICD-10-CM

## 2021-07-22 DIAGNOSIS — R00.2 PALPITATIONS: ICD-10-CM

## 2021-07-22 PROCEDURE — 93246 EXT ECG>7D<15D RECORDING: CPT | Performed by: INTERNAL MEDICINE

## 2021-07-22 PROCEDURE — 99204 OFFICE O/P NEW MOD 45 MIN: CPT | Performed by: INTERNAL MEDICINE

## 2021-07-22 PROCEDURE — 93248 EXT ECG>7D<15D REV&INTERPJ: CPT | Performed by: INTERNAL MEDICINE

## 2021-07-22 RX ORDER — LEVONORGESTREL AND ETHINYL ESTRADIOL 0.15-0.03
1 KIT ORAL
COMMUNITY
Start: 2021-04-16 | End: 2023-11-01

## 2021-07-22 NOTE — NURSING NOTE
"Chief Complaint   Patient presents with     Chest Pain     Dr Keller: new pt. Chest pain, palpitations.  Hx of ADHD- ? If on adderal.      Palpitations       Initial /82 (BP Location: Left arm, Patient Position: Chair, Cuff Size: Adult Large)   Pulse 97   Wt 76.2 kg (168 lb)   SpO2 95%   BMI 27.96 kg/m   Estimated body mass index is 27.96 kg/m  as calculated from the following:    Height as of 6/18/21: 1.651 m (5' 5\").    Weight as of this encounter: 76.2 kg (168 lb)..  BP completed using cuff size: terrence Paniagua MA  "

## 2021-07-22 NOTE — LETTER
7/22/2021      RE: Sofy Stringer  [address] [address] [id number]       Dear Colleague,    Thank you for the opportunity to participate in the care of your patient, Sofy Stringer, at the [facility]. Please see a copy of my visit note below.       SUBJECTIVE:  Sofy Stringer is a 21 year old female who presents for evaluation of chest pain and palpitations.  Symptoms started 2 months ago.  Progressive.  Chest pain is on and off unrelated to activity.  Mostly at rest last 5 to 20 minutes.  No aggravating or relieving factors.  No associated symptoms.  No radiation.  Palpitations appears like intermittent skipped beats also have intermittent fast heartbeat and then skipped beats.  This is mostly at rest patient started kickboxing recently and have no symptoms during activity.  She has no significant risk factors.  No excess alcohol caffeine or drugs.  Patient had a recent ED visit with same symptoms.  Evaluations were normal.    Patient Active Problem List    Diagnosis Date Noted     Obesity 01/28/2014     Priority: Medium     Acne 01/28/2014     Priority: Medium    .  Current Outpatient Medications   Medication Sig     Bioflavonoid Products (VITAMIN C) CHEW Take 2 chew tab by mouth daily     cetirizine (ZYRTEC) 10 MG tablet Take 10 mg by mouth daily     diphenhydrAMINE (BENADRYL) 25 MG tablet Take 50 mg by mouth every 6 hours as needed for itching or allergies     Iron Combinations (IRON COMPLEX) CAPS Take 1 capsule by mouth daily     levonorgestrel-ethinyl estradiol (NORDETTE) 0.15-30 MG-MCG tablet Take 1 tablet by mouth     MULTI VIT/FL OR None Entered     ondansetron (ZOFRAN) 4 MG tablet Take 1 tablet (4 mg) by mouth every 8 hours as needed for nausea     Probiotic Product (PRO-BIOTIC BLEND) CAPS Take 1 capsule by mouth daily Reported on 5/11/2017     No current facility-administered medications for this  visit.     History reviewed. No pertinent past medical history.  Past Surgical History:   Procedure Laterality Date     TONSILLECTOMY & ADENOIDECTOMY  2002     Allergies   Allergen Reactions     Penicillins Rash     Social History     Socioeconomic History     Marital status: Single     Spouse name: Not on file     Number of children: Not on file     Years of education: Not on file     Highest education level: Not on file   Occupational History     Not on file   Tobacco Use     Smoking status: Never Smoker     Smokeless tobacco: Never Used     Tobacco comment: Lives in smoke free household   Substance and Sexual Activity     Alcohol use: No     Drug use: No     Sexual activity: Never   Other Topics Concern     Parent/sibling w/ CABG, MI or angioplasty before 65F 55M? No   Social History Narrative    ENVIRONMENTAL HISTORY: [place] The home is heated with a forced air and gas furnace. They do have central air conditioning. The patient's bedroom is furnished with carpeting in bedroom and fabric window coverings.  Pets inside the house include 1 dog(s). There is no history of cockroach or mice infestation. There is/are 1 smokers living in the house.  There is/are 0 who smoke ecigarettes/vape living in the house.The house does not have a damp basement.         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 She lives with her mother, grandmother, and brother.  Her mother is an RN.     Social Determinants of Health     Financial Resource Strain:      Difficulty of Paying Living Expenses:    Food Insecurity:      Worried About Running Out of Food in the Last Year:      Ran Out of Food in the Last Year:    Transportation Needs:      Lack of Transportation (Medical):      Lack of Transportation (Non-Medical):    Physical Activity:      Days of Exercise per Week:      Minutes of Exercise per Session:    Stress:      Feeling of Stress  :    Social Connections:      Frequency of Communication with Friends and Family:      Frequency of Social Gatherings with Friends and Family:      Attends Temple Services:      Active Member of Clubs or Organizations:      Attends Club or Organization Meetings:      Marital Status:    Intimate Partner Violence:      Fear of Current or Ex-Partner:      Emotionally Abused:      Physically Abused:      Sexually Abused:      Family History   Problem Relation Age of Onset     Thyroid Disease Father      Hypertension Maternal Grandmother      Lipids Maternal Grandmother      Skin Cancer Maternal Grandmother      Diabetes Maternal Grandmother      Hyperlipidemia Maternal Grandmother      Cancer - colorectal Maternal Grandfather      Cancer Maternal Grandfather      Colon Cancer Maternal Grandfather      Asthma Maternal Grandfather      Cerebrovascular Disease Other      Glaucoma No family hx of      Macular Degeneration No family hx of           REVIEW OF SYSTEMS:  General: negative, fever, chills, night sweats  Skin: negative, acne, rash and scaling  Eyes: negative, double vision, eye pain and photophobia  Ears/Nose/Throat: negative, nasal congestion and purulent rhinorrhea  Respiratory: No dyspnea on exertion, No cough, No hemoptysis and negative       OBJECTIVE:  Blood pressure 128/82, pulse 97, weight 76.2 kg (168 lb), SpO2 95 %, not currently breastfeeding.  General Appearance: healthy, alert, active and no distress  Head: Normocephalic. No masses, lesions, tenderness or abnormalities  Eyes: conjuctiva clear, PERRL, EOM intact  Ears: External ears normal. Canals clear. TM's normal.  Nose: Nares normal  Mouth: normal  Neck: Supple, no cervical adenopathy, no thyromegaly  Lungs: clear to auscultation  Cardiac: regular rate and rhythm, normal S1 and S2, no murmur         ASSESSMENT/PLAN:  Patient here for evaluation of chest pain and palpitation.  Chest pain is atypical.  Nonexertional predominantly.  No aggravating  or relieving factors and no associated symptoms.  Palpitations from description sounds like ectopy with intermittent fast heartbeat.  No dizziness or loss of consciousness.  Symptoms started 2 months ago.  She has no significant cardiac risk factors.  No excess alcohol caffeine or drugs.  Patient had a recent ED visit.  Labs EKG and x-ray were normal.  According to patient her father had WPW syndrome but her EKG is normal.  Reassured the patient that most of her symptoms sounds benign.  But will plan for a 2-week Zio patch and also an echocardiogram to assess structure and cardiac function.  If this is normal no further testing is needed.  Patient wanted to continue her kickboxing and encouraged to do so.  Patient will be contacted with the test results and plan for further management.  Per orders.   Return to Clinic as needed.  Total visit duration 45 minutes.  This includes face-to-face interview physical exam, chart review, review of emergency room records EKGs and documentation.      Please do not hesitate to contact me if you have any questions/concerns.     Sincerely,     KAREN Hollis MD

## 2021-07-22 NOTE — PATIENT INSTRUCTIONS
Thank you for coming to the [facility] @ [facility]; please note the following instructions:    1. Echocardiogram    2.  We have applied a holter (heart) monitor for you to wear for 2 weeks.  You may remove the heart monitor on 8/5/21 at 3:00pm.  Please see the enclosed instructions for further information, as well as instructions for removal of the heart monitor and return mailing directions.  If you should have questions regarding your monitor, please call [employer] at [phone].  The Cardiology Nurse will contact you with your results (please see result notification details at bottom of summary).    *PLEASE DO NOT SHOWER OR INDUCE EXCESSIVE SWEATING IN THE FIRST 24 HOURS OF WEARING*    3. Follow up as needed      If you have any questions regarding your visit please contact your care team:     Cardiology  Telephone Number   Dinorah LLOYD, RN  Kelsie COURTNEY, RN   Michaela URIBE, RMA  Viviana RAMOS, RMA  Abiodun HA, LPN   [phone] (option [phone])   For scheduling appts:     [phone] (select option 1)       For the Device Clinic (Pacemakers and ICD's)  RN's :  Kelsey Murphy   During business hours: [phone]    *After business hours:  [phone] (select option 4)      Normal test result notifications will be released via [employer] or mailed within 7 business days.  All other test results, will be communicated via telephone once reviewed by your cardiologist.    If you need a medication refill please contact your pharmacy.  Please allow 3 business days for your refill to be completed.    As always, thank you for trusting us with your health care needs!

## 2021-07-22 NOTE — PROGRESS NOTES
SUBJECTIVE:  Sofy Stringer is a 21 year old female who presents for evaluation of chest pain and palpitations.  Symptoms started 2 months ago.  Progressive.  Chest pain is on and off unrelated to activity.  Mostly at rest last 5 to 20 minutes.  No aggravating or relieving factors.  No associated symptoms.  No radiation.  Palpitations appears like intermittent skipped beats also have intermittent fast heartbeat and then skipped beats.  This is mostly at rest patient started kickboxing recently and have no symptoms during activity.  She has no significant risk factors.  No excess alcohol caffeine or drugs.  Patient had a recent ED visit with same symptoms.  Evaluations were normal.    Patient Active Problem List    Diagnosis Date Noted     Obesity 01/28/2014     Priority: Medium     Acne 01/28/2014     Priority: Medium    .  Current Outpatient Medications   Medication Sig     Bioflavonoid Products (VITAMIN C) CHEW Take 2 chew tab by mouth daily     cetirizine (ZYRTEC) 10 MG tablet Take 10 mg by mouth daily     diphenhydrAMINE (BENADRYL) 25 MG tablet Take 50 mg by mouth every 6 hours as needed for itching or allergies     Iron Combinations (IRON COMPLEX) CAPS Take 1 capsule by mouth daily     levonorgestrel-ethinyl estradiol (NORDETTE) 0.15-30 MG-MCG tablet Take 1 tablet by mouth     MULTI VIT/FL OR None Entered     ondansetron (ZOFRAN) 4 MG tablet Take 1 tablet (4 mg) by mouth every 8 hours as needed for nausea     Probiotic Product (PRO-BIOTIC BLEND) CAPS Take 1 capsule by mouth daily Reported on 5/11/2017     No current facility-administered medications for this visit.     History reviewed. No pertinent past medical history.  Past Surgical History:   Procedure Laterality Date     TONSILLECTOMY & ADENOIDECTOMY  2002     Allergies   Allergen Reactions     Penicillins Rash     Social History     Socioeconomic History     Marital status: Single     Spouse name: Not on file     Number of children: Not on file      Years of education: Not on file     Highest education level: Not on file   Occupational History     Not on file   Tobacco Use     Smoking status: Never Smoker     Smokeless tobacco: Never Used     Tobacco comment: Lives in smoke free household   Substance and Sexual Activity     Alcohol use: No     Drug use: No     Sexual activity: Never   Other Topics Concern     Parent/sibling w/ CABG, MI or angioplasty before 65F 55M? No   Social History Narrative    ENVIRONMENTAL HISTORY: [place] The home is heated with a forced air and gas furnace. They do have central air conditioning. The patient's bedroom is furnished with carpeting in bedroom and fabric window coverings.  Pets inside the house include 1 dog(s). There is no history of cockroach or mice infestation. There is/are 1 smokers living in the house.  There is/are 0 who smoke ecigarettes/vape living in the house.The house does not have a damp basement.         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 She lives with her mother, grandmother, and brother.  Her mother is an RN.     Social Determinants of Health     Financial Resource Strain:      Difficulty of Paying Living Expenses:    Food Insecurity:      Worried About Running Out of Food in the Last Year:      Ran Out of Food in the Last Year:    Transportation Needs:      Lack of Transportation (Medical):      Lack of Transportation (Non-Medical):    Physical Activity:      Days of Exercise per Week:      Minutes of Exercise per Session:    Stress:      Feeling of Stress :    Social Connections:      Frequency of Communication with Friends and Family:      Frequency of Social Gatherings with Friends and Family:      Attends Church Services:      Active Member of Clubs or Organizations:      Attends Club or Organization Meetings:      Marital Status:    Intimate Partner Violence:      Fear of Current or Ex-Partner:       Emotionally Abused:      Physically Abused:      Sexually Abused:      Family History   Problem Relation Age of Onset     Thyroid Disease Father      Hypertension Maternal Grandmother      Lipids Maternal Grandmother      Skin Cancer Maternal Grandmother      Diabetes Maternal Grandmother      Hyperlipidemia Maternal Grandmother      Cancer - colorectal Maternal Grandfather      Cancer Maternal Grandfather      Colon Cancer Maternal Grandfather      Asthma Maternal Grandfather      Cerebrovascular Disease Other      Glaucoma No family hx of      Macular Degeneration No family hx of           REVIEW OF SYSTEMS:  General: negative, fever, chills, night sweats  Skin: negative, acne, rash and scaling  Eyes: negative, double vision, eye pain and photophobia  Ears/Nose/Throat: negative, nasal congestion and purulent rhinorrhea  Respiratory: No dyspnea on exertion, No cough, No hemoptysis and negative       OBJECTIVE:  Blood pressure 128/82, pulse 97, weight 76.2 kg (168 lb), SpO2 95 %, not currently breastfeeding.  General Appearance: healthy, alert, active and no distress  Head: Normocephalic. No masses, lesions, tenderness or abnormalities  Eyes: conjuctiva clear, PERRL, EOM intact  Ears: External ears normal. Canals clear. TM's normal.  Nose: Nares normal  Mouth: normal  Neck: Supple, no cervical adenopathy, no thyromegaly  Lungs: clear to auscultation  Cardiac: regular rate and rhythm, normal S1 and S2, no murmur         ASSESSMENT/PLAN:  Patient here for evaluation of chest pain and palpitation.  Chest pain is atypical.  Nonexertional predominantly.  No aggravating or relieving factors and no associated symptoms.  Palpitations from description sounds like ectopy with intermittent fast heartbeat.  No dizziness or loss of consciousness.  Symptoms started 2 months ago.  She has no significant cardiac risk factors.  No excess alcohol caffeine or drugs.  Patient had a recent ED visit.  Labs EKG and x-ray were normal.   According to patient her father had WPW syndrome but her EKG is normal.  Reassured the patient that most of her symptoms sounds benign.  But will plan for a 2-week Zio patch and also an echocardiogram to assess structure and cardiac function.  If this is normal no further testing is needed.  Patient wanted to continue her kickboxing and encouraged to do so.  Patient will be contacted with the test results and plan for further management.  Per orders.   Return to Clinic as needed.  Total visit duration 45 minutes.  This includes face-to-face interview physical exam, chart review, review of emergency room records EKGs and documentation.

## 2021-08-09 ENCOUNTER — ANCILLARY PROCEDURE (OUTPATIENT)
Dept: CARDIOLOGY | Facility: CLINIC | Age: 21
End: 2021-08-09
Attending: INTERNAL MEDICINE
Payer: COMMERCIAL

## 2021-08-09 DIAGNOSIS — R07.9 CHEST PAIN, UNSPECIFIED TYPE: ICD-10-CM

## 2021-08-09 DIAGNOSIS — R00.2 PALPITATIONS: ICD-10-CM

## 2021-08-09 LAB — LVEF ECHO: NORMAL

## 2021-08-09 PROCEDURE — 93306 TTE W/DOPPLER COMPLETE: CPT | Mod: GC | Performed by: STUDENT IN AN ORGANIZED HEALTH CARE EDUCATION/TRAINING PROGRAM

## 2021-09-25 ENCOUNTER — HEALTH MAINTENANCE LETTER (OUTPATIENT)
Age: 21
End: 2021-09-25

## 2021-11-03 ENCOUNTER — VIRTUAL VISIT (OUTPATIENT)
Dept: FAMILY MEDICINE | Facility: CLINIC | Age: 21
End: 2021-11-03
Payer: COMMERCIAL

## 2021-11-03 DIAGNOSIS — R41.840 ATTENTION DEFICIT: Primary | ICD-10-CM

## 2021-11-03 PROCEDURE — 99214 OFFICE O/P EST MOD 30 MIN: CPT | Mod: TEL | Performed by: FAMILY MEDICINE

## 2021-11-03 RX ORDER — METHYLPHENIDATE HYDROCHLORIDE 18 MG/1
18 TABLET ORAL EVERY MORNING
Qty: 30 TABLET | Refills: 0 | Status: SHIPPED | OUTPATIENT
Start: 2021-11-03 | End: 2022-09-28

## 2021-11-03 NOTE — PROGRESS NOTES
Sofy is a 21 year old who is being evaluated via a billable telephone visit.      What phone number would you like to be contacted at? [phone]  How would you like to obtain your AVS? Cayla    ADHD - present since childhood. Symptoms are inattention, lack of focus, easy distractibility poor memory, not finishing tasks. Affects performance at school. Has some anxiety but denies symptoms of depression, nuzhat or hypomania. Denies use of excessive alcohol, recreational drugs.   Evaluation and treatment:   I referred her previously for formal testing - had to be canceled. I asked her to set it up again.    Adderall XR 10 mg - did not work and caused loss of appetite. She told me she would get nauseated when around food.   She wants to try a different brand - I rx'd Concerta 18 mg daily.   Counseling done previously regarding tolerance, dependence, abuse with these medications.     Exam:    There were no vitals taken for this visit.    Gen: on telephone, healthy female in no acute distress. Accompanied by mom, Sondra.    Assessment and Plan - Decision Making    1. Attention deficit    Per HPI    - methylphenidate HCl ER (CONCERTA) 18 MG CR tablet; Take 1 tablet (18 mg) by mouth every morning  Dispense: 30 tablet; Refill: 0      Written instructions given as follows:    Patient Instructions   1. Try Adderall 10 mg in the morning - we have the option to increase the dose.    2. You will get a call from our mental health department for ADHD testing.      Total time on the phone and reviewing records: 12 min

## 2022-03-04 ENCOUNTER — TELEPHONE (OUTPATIENT)
Dept: BEHAVIORAL HEALTH | Facility: CLINIC | Age: 22
End: 2022-03-04
Payer: COMMERCIAL

## 2022-03-04 NOTE — TELEPHONE ENCOUNTER
Spoke with Pt and pt is planning on streching the video visit out to 3/15/22 instead.    Pt. Will give intake a call to update that info.

## 2022-04-05 RX ORDER — BENZOYL PEROXIDE 50 MG/ML
LIQUID TOPICAL
Qty: 227 G | Refills: 2 | OUTPATIENT
Start: 2022-04-05

## 2022-04-05 NOTE — TELEPHONE ENCOUNTER
"Routing refill request to provider for review/approval because:  Requested Prescriptions   Pending Prescriptions Disp Refills    BENZOYL PEROXIDE WASH 5 % external liquid [Pharmacy Med Name: BENZOYL PEROXIDE WASH 5% LIQD] 227 g 2     Sig: USE AS DIRECTED TO WASH FACE DAILY        Topical Acne Medications Protocol Failed - 4/5/2022  2:16 PM        Failed - Medication is active on med list        Passed - Patient is 12 years of age or older        Passed - Recent (12 mo) or future (30 days) visit within the authorizing provider's specialty     Patient has had an office visit with the authorizing provider or a provider within the authorizing providers department within the previous 12 mos or has a future within next 30 days. See \"Patient Info\" tab in inbasket, or \"Choose Columns\" in Meds & Orders section of the refill encounter.                      Jessa MCDOWELLN, RN        "

## 2022-04-07 NOTE — TELEPHONE ENCOUNTER
Left message on patient's voicemail to call back and set up an appointment.Phoebe Burnette MA/JACOBO

## 2022-06-18 ENCOUNTER — ANCILLARY PROCEDURE (OUTPATIENT)
Dept: GENERAL RADIOLOGY | Facility: CLINIC | Age: 22
End: 2022-06-18
Attending: PEDIATRICS
Payer: COMMERCIAL

## 2022-06-18 ENCOUNTER — OFFICE VISIT (OUTPATIENT)
Dept: ORTHOPEDICS | Facility: CLINIC | Age: 22
End: 2022-06-18

## 2022-06-18 VITALS — DIASTOLIC BLOOD PRESSURE: 62 MMHG | WEIGHT: 168 LBS | SYSTOLIC BLOOD PRESSURE: 110 MMHG | BODY MASS INDEX: 27.96 KG/M2

## 2022-06-18 DIAGNOSIS — S99.912A INJURY OF LEFT ANKLE, INITIAL ENCOUNTER: ICD-10-CM

## 2022-06-18 DIAGNOSIS — S99.912A INJURY OF LEFT ANKLE, INITIAL ENCOUNTER: Primary | ICD-10-CM

## 2022-06-18 PROCEDURE — 99204 OFFICE O/P NEW MOD 45 MIN: CPT | Performed by: PEDIATRICS

## 2022-06-18 PROCEDURE — 73610 X-RAY EXAM OF ANKLE: CPT | Mod: TC | Performed by: RADIOLOGY

## 2022-06-18 ASSESSMENT — PAIN SCALES - GENERAL: PAINLEVEL: SEVERE PAIN (6)

## 2022-06-18 NOTE — PATIENT INSTRUCTIONS
Injury to the left ankle appears to be soft tissue related.  We discussed in particular potential tendinous issue, with what appears to be some snapping of the peroneal tendons laterally.  Icing, elevation, rest from activities.  Over-the-counter medication as needed for pain.  We discussed support for the ankle; anticipate use of Cam walker, and may use crutches as needed.  Discussed additionally potential to obtain an MRI.  Given nature of the injury, plan to obtain MRI of the left ankle next.  Plan to contact you with MRI results.    Advanced imaging is done by appointment. Please call [place] [place] [facility] [place]: [phone] to schedule your MRI.     Depending on your availability you can usually schedule within the next 1-2 days.    Some insurance companies may require a prior authorization to be completed which can delay the time until you are able to schedule your appointment.       If you are active on [employer], you may have access to your test results before your provider is able to review the study and advise on next steps.      The clinic will call you with results. If you have not heard from the clinic within 2-3 days following your MRI, please contact us at the number listed below.    If you have any further questions for your physician or physician s care team you can call [phone] and use option 3 to leave a voice message. Calls received during business hours will be returned same day.

## 2022-06-18 NOTE — LETTER
6/18/2022         RE: Sofy Stringer  [address] [address] [facility] [address]        Dear Colleague,    Thank you for referring your patient, Sofy Stringer, to the [facility]. Please see a copy of my visit note below.    ASSESSMENT & PLAN    Sofy was seen today for pain.    Diagnoses and all orders for this visit:    Injury of left ankle, initial encounter  -     XR Ankle Left G/E 3 Views; Future  -     MR Ankle Left w/o Contrast; Future      Undiagnosed new problem, unclear prognosis.      See Patient Instructions  Patient Instructions   Injury to the left ankle appears to be soft tissue related.  We discussed in particular potential tendinous issue, with what appears to be some snapping of the peroneal tendons laterally.  Icing, elevation, rest from activities.  Over-the-counter medication as needed for pain.  We discussed support for the ankle; anticipate use of Cam walker, and may use crutches as needed.  Discussed additionally potential to obtain an MRI.  Given nature of the injury, plan to obtain MRI of the left ankle next.  Plan to contact you with MRI results.    Advanced imaging is done by appointment. Please call [facility] [place]: [phone] to schedule your MRI.     Depending on your availability you can usually schedule within the next 1-2 days.    Some insurance companies may require a prior authorization to be completed which can delay the time until you are able to schedule your appointment.       If you are active on [employer], you may have access to your test results before your provider is able to review the study and advise on next steps.      The clinic will call you with results. If you have not heard from the clinic within 2-3 days following your MRI, please contact us at the number listed below.    If you have any further questions for your physician or physician s care team you can call [phone] and use option 3 to  leave a voice message. Calls received during business hours will be returned same day.        David Guevara DO  [facility]    -----  Chief Complaint   Patient presents with     Left Ankle - Pain       SUBJECTIVE  Sofy Stringer is a/an 22 year old female who is seen as an AIC for evaluation of left ankle injury    The patient is seen with their mother.    Onset: Last night, was chasing a puppy and fell down a hill while running. She is unsure direction her foot went.  Location of Pain: left posterolateral ankle and posterior ankle. Radiating pain into the posterior lower leg.   Worsened by: weightbearing  Better with: non-weightbearing  Treatments tried: ice and elevation. Ibuprofen  Associated symptoms: snap upon injury. Swelling over posterolateral ankle    Orthopedic/Surgical history: Yes: minor ankle sprains  Social History/Occupation: Summer break from school and works at [employer] on weekends      **  Slipped, heard a snap, then pain.  Unable to get back in on own, and unable to bear weight. Still unable to bear weight.    Pain more lateral, posterior  + swelling, noted mostly this morning. No clear bruising.  No additional joint noise.      REVIEW OF SYSTEMS:  Review of Systems      OBJECTIVE:  /62   Wt 76.2 kg (168 lb)   BMI 27.96 kg/m     General: healthy, alert and in no distress  HEENT: no scleral icterus or conjunctival erythema  Skin: no suspicious lesions or rash. No jaundice.  CV: distal perfusion intact   Resp: normal respiratory effort without conversational dyspnea   Psych: normal mood and affect  Gait: wheelchair  Neuro: Normal light sensory exam of  extremity       Left Ankle Exam:    Inspection:       no visible ecchymosis       Normal DP artery pulse       Normal PT artery pulse       Mild-mod lateral ankle swelling, over level of lateral malleolus    ROM:        limited dorsiflexion ~5 deg       limited plantarflexion ~20-25 deg       limited  inversion lateral pain       limited eversion lateral pain  Snapping laterally, over level of peroneal tendons, with ankle motion    Strength:  Able to resist all above motions, with pain    Tender:       lateral malleolus       lateral ankle ligaments       peroneal tendon sheath       achilles    Skin:       well perfused       capillary refill brisk    Special Tests:        anterior drawer with pain        talar tilt with pain       deferred external rotation testing   Mcconnell test normal    Sensation:  Grossly intact to light touch    Regional pulses:  Normal      RADIOLOGY:  I independently ordered, visualized and reviewed these images with the patient  No clear acute bony abnormality. Lateral ankle soft tissue swelling. Mortise appears intact.      XR Ankle Left G/E 3 Views    Narrative    EXAM: XR ANKLE LEFT G/E 3 VIEWS  LOCATION: [facility]  DATE/TIME: 06/18/2022, 11:25 AM    INDICATION: Ankle pain since injury.  COMPARISON: None.      Impression    IMPRESSION: Lateral soft tissue swelling. No fracture or talar dome osteochondral lesion.                   Again, thank you for allowing me to participate in the care of your patient.        Sincerely,        David Guevara DO

## 2022-06-18 NOTE — PROGRESS NOTES
ASSESSMENT & PLAN    Sofy was seen today for pain.    Diagnoses and all orders for this visit:    Injury of left ankle, initial encounter  -     XR Ankle Left G/E 3 Views; Future  -     MR Ankle Left w/o Contrast; Future      Undiagnosed new problem, unclear prognosis.      See Patient Instructions  Patient Instructions   Injury to the left ankle appears to be soft tissue related.  We discussed in particular potential tendinous issue, with what appears to be some snapping of the peroneal tendons laterally.  Icing, elevation, rest from activities.  Over-the-counter medication as needed for pain.  We discussed support for the ankle; anticipate use of Cam walker, and may use crutches as needed.  Discussed additionally potential to obtain an MRI.  Given nature of the injury, plan to obtain MRI of the left ankle next.  Plan to contact you with MRI results.    Advanced imaging is done by appointment. Please call [place] [place] [facility] [place]: [phone] to schedule your MRI.     Depending on your availability you can usually schedule within the next 1-2 days.    Some insurance companies may require a prior authorization to be completed which can delay the time until you are able to schedule your appointment.       If you are active on [employer], you may have access to your test results before your provider is able to review the study and advise on next steps.      The clinic will call you with results. If you have not heard from the clinic within 2-3 days following your MRI, please contact us at the number listed below.    If you have any further questions for your physician or physician s care team you can call [phone] and use option 3 to leave a voice message. Calls received during business hours will be returned same day.        David Guevara [facility]    -----  Chief Complaint   Patient presents with     Left Ankle - Pain       SUBJECTIVE  Sofy MATHEW  [place] is a/an 22 year old female who is seen as an AIC for evaluation of left ankle injury    The patient is seen with their mother.    Onset: Last night, was chasing a puppy and fell down a hill while running. She is unsure direction her foot went.  Location of Pain: left posterolateral ankle and posterior ankle. Radiating pain into the posterior lower leg.   Worsened by: weightbearing  Better with: non-weightbearing  Treatments tried: ice and elevation. Ibuprofen  Associated symptoms: snap upon injury. Swelling over posterolateral ankle    Orthopedic/Surgical history: Yes: minor ankle sprains  Social History/Occupation: Summer break from school and works at [employer] on weekends      **  Slipped, heard a snap, then pain.  Unable to get back in on own, and unable to bear weight. Still unable to bear weight.    Pain more lateral, posterior  + swelling, noted mostly this morning. No clear bruising.  No additional joint noise.      REVIEW OF SYSTEMS:  Review of Systems      OBJECTIVE:  /62   Wt 76.2 kg (168 lb)   BMI 27.96 kg/m     General: healthy, alert and in no distress  HEENT: no scleral icterus or conjunctival erythema  Skin: no suspicious lesions or rash. No jaundice.  CV: distal perfusion intact   Resp: normal respiratory effort without conversational dyspnea   Psych: normal mood and affect  Gait: wheelchair  Neuro: Normal light sensory exam of  extremity       Left Ankle Exam:    Inspection:       no visible ecchymosis       Normal DP artery pulse       Normal PT artery pulse       Mild-mod lateral ankle swelling, over level of lateral malleolus    ROM:        limited dorsiflexion ~5 deg       limited plantarflexion ~20-25 deg       limited inversion lateral pain       limited eversion lateral pain  Snapping laterally, over level of peroneal tendons, with ankle motion    Strength:  Able to resist all above motions, with pain    Tender:       lateral malleolus       lateral ankle ligaments       peroneal  tendon sheath       achilles    Skin:       well perfused       capillary refill brisk    Special Tests:        anterior drawer with pain        talar tilt with pain       deferred external rotation testing   Mcconnell test normal    Sensation:  Grossly intact to light touch    Regional pulses:  Normal      RADIOLOGY:  I independently ordered, visualized and reviewed these images with the patient  No clear acute bony abnormality. Lateral ankle soft tissue swelling. Mortise appears intact.      XR Ankle Left G/E 3 Views    Narrative    EXAM: XR ANKLE LEFT G/E 3 VIEWS  LOCATION: [facility]  DATE/TIME: 06/18/2022, 11:25 AM    INDICATION: Ankle pain since injury.  COMPARISON: None.      Impression    IMPRESSION: Lateral soft tissue swelling. No fracture or talar dome osteochondral lesion.

## 2022-06-29 ENCOUNTER — LAB REQUISITION (OUTPATIENT)
Dept: LAB | Facility: CLINIC | Age: 22
End: 2022-06-29

## 2022-06-29 PROCEDURE — 86481 TB AG RESPONSE T-CELL SUSP: CPT | Performed by: INTERNAL MEDICINE

## 2022-06-30 LAB
GAMMA INTERFERON BACKGROUND BLD IA-ACNC: 0 IU/ML
M TB IFN-G BLD-IMP: NEGATIVE
M TB IFN-G CD4+ BCKGRND COR BLD-ACNC: 10 IU/ML
MITOGEN IGNF BCKGRD COR BLD-ACNC: 0.01 IU/ML
MITOGEN IGNF BCKGRD COR BLD-ACNC: 0.01 IU/ML
QUANTIFERON MITOGEN: 10 IU/ML
QUANTIFERON NIL TUBE: 0 IU/ML
QUANTIFERON TB1 TUBE: 0.01 IU/ML
QUANTIFERON TB2 TUBE: 0.01

## 2022-09-28 ENCOUNTER — OFFICE VISIT (OUTPATIENT)
Dept: FAMILY MEDICINE | Facility: CLINIC | Age: 22
End: 2022-09-28
Payer: COMMERCIAL

## 2022-09-28 VITALS
HEIGHT: 61 IN | TEMPERATURE: 97.2 F | OXYGEN SATURATION: 99 % | SYSTOLIC BLOOD PRESSURE: 134 MMHG | BODY MASS INDEX: 34.93 KG/M2 | WEIGHT: 185 LBS | HEART RATE: 81 BPM | DIASTOLIC BLOOD PRESSURE: 88 MMHG

## 2022-09-28 DIAGNOSIS — Z13.1 SCREENING FOR DIABETES MELLITUS: ICD-10-CM

## 2022-09-28 DIAGNOSIS — Z11.3 SCREENING FOR STDS (SEXUALLY TRANSMITTED DISEASES): ICD-10-CM

## 2022-09-28 DIAGNOSIS — R53.83 FATIGUE, UNSPECIFIED TYPE: ICD-10-CM

## 2022-09-28 DIAGNOSIS — Z00.00 ROUTINE GENERAL MEDICAL EXAMINATION AT A HEALTH CARE FACILITY: Primary | ICD-10-CM

## 2022-09-28 DIAGNOSIS — Z13.220 LIPID SCREENING: ICD-10-CM

## 2022-09-28 LAB
BASOPHILS # BLD AUTO: 0 10E3/UL (ref 0–0.2)
BASOPHILS NFR BLD AUTO: 1 %
EOSINOPHIL # BLD AUTO: 0.2 10E3/UL (ref 0–0.7)
EOSINOPHIL NFR BLD AUTO: 3 %
ERYTHROCYTE [DISTWIDTH] IN BLOOD BY AUTOMATED COUNT: 14.1 % (ref 10–15)
HCT VFR BLD AUTO: 35.9 % (ref 35–47)
HGB BLD-MCNC: 11.5 G/DL (ref 11.7–15.7)
LYMPHOCYTES # BLD AUTO: 2.5 10E3/UL (ref 0.8–5.3)
LYMPHOCYTES NFR BLD AUTO: 40 %
MCH RBC QN AUTO: 27.8 PG (ref 26.5–33)
MCHC RBC AUTO-ENTMCNC: 32 G/DL (ref 31.5–36.5)
MCV RBC AUTO: 87 FL (ref 78–100)
MONOCYTES # BLD AUTO: 0.4 10E3/UL (ref 0–1.3)
MONOCYTES NFR BLD AUTO: 6 %
NEUTROPHILS # BLD AUTO: 3.2 10E3/UL (ref 1.6–8.3)
NEUTROPHILS NFR BLD AUTO: 50 %
PLATELET # BLD AUTO: 309 10E3/UL (ref 150–450)
RBC # BLD AUTO: 4.14 10E6/UL (ref 3.8–5.2)
VIT B12 SERPL-MCNC: 587 PG/ML (ref 232–1245)
WBC # BLD AUTO: 6.3 10E3/UL (ref 4–11)

## 2022-09-28 PROCEDURE — 99213 OFFICE O/P EST LOW 20 MIN: CPT | Mod: 25 | Performed by: NURSE PRACTITIONER

## 2022-09-28 PROCEDURE — 84439 ASSAY OF FREE THYROXINE: CPT | Performed by: NURSE PRACTITIONER

## 2022-09-28 PROCEDURE — 82728 ASSAY OF FERRITIN: CPT | Performed by: NURSE PRACTITIONER

## 2022-09-28 PROCEDURE — 36415 COLL VENOUS BLD VENIPUNCTURE: CPT | Performed by: NURSE PRACTITIONER

## 2022-09-28 PROCEDURE — 82607 VITAMIN B-12: CPT | Performed by: NURSE PRACTITIONER

## 2022-09-28 PROCEDURE — 82306 VITAMIN D 25 HYDROXY: CPT | Performed by: NURSE PRACTITIONER

## 2022-09-28 PROCEDURE — 87591 N.GONORRHOEAE DNA AMP PROB: CPT | Performed by: NURSE PRACTITIONER

## 2022-09-28 PROCEDURE — 99395 PREV VISIT EST AGE 18-39: CPT | Performed by: NURSE PRACTITIONER

## 2022-09-28 PROCEDURE — 87491 CHLMYD TRACH DNA AMP PROBE: CPT | Performed by: NURSE PRACTITIONER

## 2022-09-28 PROCEDURE — 80061 LIPID PANEL: CPT | Performed by: NURSE PRACTITIONER

## 2022-09-28 PROCEDURE — 80050 GENERAL HEALTH PANEL: CPT | Performed by: NURSE PRACTITIONER

## 2022-09-28 ASSESSMENT — ENCOUNTER SYMPTOMS
CONSTIPATION: 0
PALPITATIONS: 0
FEVER: 0
DYSURIA: 0
COUGH: 0
DIZZINESS: 0
NAUSEA: 0
HEARTBURN: 0
CHILLS: 0
HEMATOCHEZIA: 0
ARTHRALGIAS: 0
MYALGIAS: 0
PARESTHESIAS: 0
FREQUENCY: 0
EYE PAIN: 0
NERVOUS/ANXIOUS: 0
HEADACHES: 0
DIARRHEA: 0
SHORTNESS OF BREATH: 0
WEAKNESS: 0
SORE THROAT: 0
HEMATURIA: 0
JOINT SWELLING: 0
ABDOMINAL PAIN: 0

## 2022-09-28 NOTE — PATIENT INSTRUCTIONS
If you decide you want to restart your birth control pill, please send me a message.        I will be in touch with lab results.         Preventive Health Recommendations  Female Ages 21 to 25     Yearly exam:   See your health care provider every year in order to  Review health changes.   Discuss preventive care.    Review your medicines if your doctor has prescribed any.    You should be tested each year for STDs (sexually transmitted diseases).     Talk to your provider about how often you should have cholesterol testing.    Get a Pap test every three years. If you have an abnormal result, your doctor may have you test more often.    If you are at risk for diabetes, you should have a diabetes test (fasting glucose).     Shots:   Get a flu shot each year.   Get a tetanus shot every 10 years.   Consider getting the shot (vaccine) that prevents cervical cancer (Gardasil).    Nutrition:   Eat at least 5 servings of fruits and vegetables each day.  Eat whole-grain bread, whole-wheat pasta and brown rice instead of white grains and rice.  Get adequate Calcium and Vitamin D.     Lifestyle  Exercise at least 150 minutes a week each week (30 minutes a day, 5 days a week). This will help you control your weight and prevent disease.  Limit alcohol to one drink per day.  No smoking.   Wear sunscreen to prevent skin cancer.  See your dentist every six months for an exam and cleaning.

## 2022-09-28 NOTE — PROGRESS NOTES
SUBJECTIVE:   CC: Sofy is an 22 year old who presents for preventive health visit.     Here for physical.     Has a list of labs she would like to have checked.  Has some mild fatigue but otherwise denies symptoms/concerns. Fasting today.     Will get flu shot at work.       No longer on OCP but will let me know if she would like to restarted it.  Denies any contraindications.     Next pap due 04/2024.       Patient has been advised of split billing requirements and indicates understanding: Yes  Healthy Habits:     Getting at least 3 servings of Calcium per day:  Yes    Bi-annual eye exam:  Yes    Dental care twice a year:  Yes    Sleep apnea or symptoms of sleep apnea:  None    Diet:  Regular (no restrictions)    Frequency of exercise:  1 day/week    Duration of exercise:  30-45 minutes    Taking medications regularly:  Yes    Medication side effects:  None    PHQ-2 Total Score: 0    Additional concerns today:  No      Today's PHQ-2 Score:   PHQ-2 ( 1999 Pfizer) 9/28/2022   Q1: Little interest or pleasure in doing things 0   Q2: Feeling down, depressed or hopeless 0   PHQ-2 Score 0   PHQ-2 Total Score (12-17 Years)- Positive if 3 or more points; Administer PHQ-A if positive -   Q1: Little interest or pleasure in doing things Not at all   Q2: Feeling down, depressed or hopeless Not at all   PHQ-2 Score 0       Abuse: Current or Past (Physical, Sexual or Emotional) - No  Do you feel safe in your environment? Yes        Social History     Tobacco Use     Smoking status: Never Smoker     Smokeless tobacco: Never Used     Tobacco comment: Lives in smoke free household   Substance Use Topics     Alcohol use: No     If you drink alcohol do you typically have >3 drinks per day or >7 drinks per week? No    Alcohol Use 9/28/2022   Prescreen: >3 drinks/day or >7 drinks/week? No   Prescreen: >3 drinks/day or >7 drinks/week? -       Reviewed orders with patient.  Reviewed health maintenance and updated orders  "accordingly - Yes      Breast Cancer Screening: n/a    Reports hx of colon cancer in maternal grandfather.  Breast in a maternal great aunt.        History of abnormal Pap smear: NO - age 21-29 PAP every 3 years recommended     Reviewed and updated as needed this visit by clinical staff   Tobacco  Allergies  Meds  Problems  Med Hx  Surg Hx  Fam Hx            Reviewed and updated as needed this visit by Provider   Tobacco  Allergies  Meds  Problems  Med Hx  Surg Hx  Fam Hx               Review of Systems   Constitutional: Negative for chills and fever.   HENT: Negative for congestion, ear pain, hearing loss and sore throat.    Eyes: Negative for pain and visual disturbance.   Respiratory: Negative for cough and shortness of breath.    Cardiovascular: Negative for chest pain, palpitations and peripheral edema.   Gastrointestinal: Negative for abdominal pain, constipation, diarrhea, heartburn, hematochezia and nausea.   Genitourinary: Negative for dysuria, frequency, genital sores, hematuria, pelvic pain, urgency and vaginal bleeding.   Musculoskeletal: Negative for arthralgias, joint swelling and myalgias.   Skin: Negative for rash.   Neurological: Negative for dizziness, weakness, headaches and paresthesias.   Psychiatric/Behavioral: Negative for mood changes. The patient is not nervous/anxious.         OBJECTIVE:   /88   Pulse 81   Temp 97.2  F (36.2  C)   Ht 1.549 m (5' 1\")   Wt 83.9 kg (185 lb)   SpO2 99%   BMI 34.96 kg/m    Physical Exam  GENERAL: healthy, alert and no distress  EYES: Eyes grossly normal to inspection, PERRL and conjunctivae and sclerae normal  HENT: normal cephalic/atraumatic  NECK: no adenopathy, no asymmetry, masses, or scars and thyroid normal to palpation  RESP: lungs clear to auscultation - no rales, rhonchi or wheezes  BREAST: normal without masses, tenderness or nipple discharge and no palpable axillary masses or adenopathy  CV: regular rate and rhythm, normal S1 " "S2, no S3 or S4, no murmur, click or rub, no peripheral edema and peripheral pulses strong  ABDOMEN: soft, nontender, no hepatosplenomegaly, no masses and bowel sounds normal  MS: no gross musculoskeletal defects noted, no edema  SKIN: no suspicious lesions or rashes  NEURO: Normal strength and tone, mentation intact and speech normal  PSYCH: mentation appears normal, affect normal/bright    Diagnostic Test Results:  Labs reviewed in Epic    ASSESSMENT/PLAN:   (Z00.00) Routine general medical examination at a health care facility  (primary encounter diagnosis)  Comment:   Plan: continue annual exams, will get flu vaccine at work.        (Z11.3) Screening for STDs (sexually transmitted diseases)  Comment: declines Hep C and HIV screen  Plan: NEISSERIA GONORRHOEA PCR, CHLAMYDIA TRACHOMATIS        PCR          (R53.83) Fatigue, unspecified type  Comment: labs in process  Plan: Vitamin D Deficiency, Vitamin B12, TSH with         free T4 reflex, CBC with platelets and         differential, Comprehensive metabolic panel         (BMP + Alb, Alk Phos, ALT, AST, Total. Bili,         TP), Ferritin          (Z13.1) Screening for diabetes mellitus  Comment:   Plan: Comprehensive metabolic panel (BMP + Alb, Alk         Phos, ALT, AST, Total. Bili, TP)          (Z13.220) Lipid screening  Comment: Plan: Lipid panel reflex to direct LDL Fasting            Patient has been advised of split billing requirements and indicates understanding: Yes    COUNSELING:  Reviewed preventive health counseling, as reflected in patient instructions    Estimated body mass index is 34.96 kg/m  as calculated from the following:    Height as of this encounter: 1.549 m (5' 1\").    Weight as of this encounter: 83.9 kg (185 lb).    Weight management plan: lifetstyle    She reports that she has never smoked. She has never used smokeless tobacco.      Counseling Resources:  ATP IV Guidelines  Pooled Cohorts Equation Calculator  Breast Cancer Risk " Calculator  BRCA-Related Cancer Risk Assessment: FHS-7 Tool  FRAX Risk Assessment  ICSI Preventive Guidelines  Dietary Guidelines for Americans, 2010  USDA's MyPlate  ASA Prophylaxis  Lung CA Screening    Liz Riojas [facility]

## 2022-09-29 DIAGNOSIS — D50.9 IRON DEFICIENCY ANEMIA, UNSPECIFIED IRON DEFICIENCY ANEMIA TYPE: ICD-10-CM

## 2022-09-29 DIAGNOSIS — E03.8 SUBCLINICAL HYPOTHYROIDISM: Primary | ICD-10-CM

## 2022-09-29 LAB
ALBUMIN SERPL-MCNC: 3.7 G/DL (ref 3.4–5)
ALP SERPL-CCNC: 89 U/L (ref 40–150)
ALT SERPL W P-5'-P-CCNC: 30 U/L (ref 0–50)
ANION GAP SERPL CALCULATED.3IONS-SCNC: 6 MMOL/L (ref 3–14)
AST SERPL W P-5'-P-CCNC: 17 U/L (ref 0–45)
BILIRUB SERPL-MCNC: 0.3 MG/DL (ref 0.2–1.3)
BUN SERPL-MCNC: 10 MG/DL (ref 7–30)
C TRACH DNA SPEC QL NAA+PROBE: NEGATIVE
CALCIUM SERPL-MCNC: 8.8 MG/DL (ref 8.5–10.1)
CHLORIDE BLD-SCNC: 106 MMOL/L (ref 94–109)
CHOLEST SERPL-MCNC: 184 MG/DL
CO2 SERPL-SCNC: 27 MMOL/L (ref 20–32)
CREAT SERPL-MCNC: 0.63 MG/DL (ref 0.52–1.04)
DEPRECATED CALCIDIOL+CALCIFEROL SERPL-MC: 24 UG/L (ref 20–75)
FASTING STATUS PATIENT QL REPORTED: YES
FERRITIN SERPL-MCNC: 10 NG/ML (ref 12–150)
GFR SERPL CREATININE-BSD FRML MDRD: >90 ML/MIN/1.73M2
GLUCOSE BLD-MCNC: 82 MG/DL (ref 70–99)
HDLC SERPL-MCNC: 44 MG/DL
LDLC SERPL CALC-MCNC: 113 MG/DL
N GONORRHOEA DNA SPEC QL NAA+PROBE: NEGATIVE
NONHDLC SERPL-MCNC: 140 MG/DL
POTASSIUM BLD-SCNC: 4 MMOL/L (ref 3.4–5.3)
PROT SERPL-MCNC: 7.6 G/DL (ref 6.8–8.8)
SODIUM SERPL-SCNC: 139 MMOL/L (ref 133–144)
T4 FREE SERPL-MCNC: 0.79 NG/DL (ref 0.76–1.46)
TRIGL SERPL-MCNC: 134 MG/DL
TSH SERPL DL<=0.005 MIU/L-ACNC: 7.17 MU/L (ref 0.4–4)

## 2022-12-09 ENCOUNTER — LAB (OUTPATIENT)
Dept: LAB | Facility: CLINIC | Age: 22
End: 2022-12-09
Payer: COMMERCIAL

## 2022-12-09 DIAGNOSIS — D50.9 IRON DEFICIENCY ANEMIA, UNSPECIFIED IRON DEFICIENCY ANEMIA TYPE: ICD-10-CM

## 2022-12-09 DIAGNOSIS — E03.8 SUBCLINICAL HYPOTHYROIDISM: ICD-10-CM

## 2022-12-09 LAB
BASOPHILS # BLD AUTO: 0 10E3/UL (ref 0–0.2)
BASOPHILS NFR BLD AUTO: 0 %
EOSINOPHIL # BLD AUTO: 0.2 10E3/UL (ref 0–0.7)
EOSINOPHIL NFR BLD AUTO: 3 %
ERYTHROCYTE [DISTWIDTH] IN BLOOD BY AUTOMATED COUNT: 14.9 % (ref 10–15)
FERRITIN SERPL-MCNC: 12 NG/ML (ref 12–150)
HCT VFR BLD AUTO: 37.8 % (ref 35–47)
HGB BLD-MCNC: 12 G/DL (ref 11.7–15.7)
LYMPHOCYTES # BLD AUTO: 2.6 10E3/UL (ref 0.8–5.3)
LYMPHOCYTES NFR BLD AUTO: 31 %
MCH RBC QN AUTO: 28 PG (ref 26.5–33)
MCHC RBC AUTO-ENTMCNC: 31.7 G/DL (ref 31.5–36.5)
MCV RBC AUTO: 88 FL (ref 78–100)
MONOCYTES # BLD AUTO: 0.5 10E3/UL (ref 0–1.3)
MONOCYTES NFR BLD AUTO: 6 %
NEUTROPHILS # BLD AUTO: 5 10E3/UL (ref 1.6–8.3)
NEUTROPHILS NFR BLD AUTO: 59 %
PLATELET # BLD AUTO: 327 10E3/UL (ref 150–450)
RBC # BLD AUTO: 4.28 10E6/UL (ref 3.8–5.2)
T4 FREE SERPL-MCNC: 0.79 NG/DL (ref 0.76–1.46)
TSH SERPL DL<=0.005 MIU/L-ACNC: 7.32 MU/L (ref 0.4–4)
WBC # BLD AUTO: 8.4 10E3/UL (ref 4–11)

## 2022-12-09 PROCEDURE — 82728 ASSAY OF FERRITIN: CPT

## 2022-12-09 PROCEDURE — 85025 COMPLETE CBC W/AUTO DIFF WBC: CPT

## 2022-12-09 PROCEDURE — 36415 COLL VENOUS BLD VENIPUNCTURE: CPT

## 2022-12-09 PROCEDURE — 84439 ASSAY OF FREE THYROXINE: CPT

## 2022-12-09 PROCEDURE — 84443 ASSAY THYROID STIM HORMONE: CPT

## 2023-01-02 ENCOUNTER — MYC MEDICAL ADVICE (OUTPATIENT)
Dept: FAMILY MEDICINE | Facility: CLINIC | Age: 23
End: 2023-01-02

## 2023-01-13 ENCOUNTER — TELEPHONE (OUTPATIENT)
Dept: FAMILY MEDICINE | Facility: CLINIC | Age: 23
End: 2023-01-13

## 2023-01-13 ENCOUNTER — VIRTUAL VISIT (OUTPATIENT)
Dept: FAMILY MEDICINE | Facility: CLINIC | Age: 23
End: 2023-01-13
Payer: COMMERCIAL

## 2023-01-13 DIAGNOSIS — E05.90 SUBCLINICAL HYPERTHYROIDISM: ICD-10-CM

## 2023-01-13 DIAGNOSIS — Z11.1 SCREENING EXAMINATION FOR PULMONARY TUBERCULOSIS: ICD-10-CM

## 2023-01-13 DIAGNOSIS — E66.811 CLASS 1 OBESITY WITHOUT SERIOUS COMORBIDITY IN ADULT, UNSPECIFIED BMI, UNSPECIFIED OBESITY TYPE: Primary | ICD-10-CM

## 2023-01-13 DIAGNOSIS — E66.811 CLASS 1 OBESITY WITHOUT SERIOUS COMORBIDITY IN ADULT, UNSPECIFIED BMI, UNSPECIFIED OBESITY TYPE: ICD-10-CM

## 2023-01-13 PROCEDURE — 99214 OFFICE O/P EST MOD 30 MIN: CPT | Mod: 95 | Performed by: FAMILY MEDICINE

## 2023-01-13 NOTE — TELEPHONE ENCOUNTER
Prior Authorization Retail Medication Request    Medication/Dose: Saxenda 18mg/3ml  ICD code (if different than what is on RX):    Previously Tried and Failed:    Rationale:      Insurance Name: [employer]  Insurance ID:  [id number]      Thank You  Margot Fagan [facility]  [phone]

## 2023-01-13 NOTE — PATIENT INSTRUCTIONS
Use Saxenda which is an injectable weight loss medication. The most common side effect are GI symptoms. This can be minimized by increasing the dose weekly. Start with 0.6 mg, then increase to 1.2 mg, then 1.8 mg, then 2.4 mg, then 3 mg daily. If GI symptoms occur, may delay going to the next dose by another week. There is increased risk of pancreatitis, gall stones, suicide thoughts and increased heart rate. There was an increase in thyroid cancer in rats but this has not been determined in humans. The cardiovascular effects of Saxenda have not been determined. Other possible side effects are low glucose, dizziness and headaches. It is expensive so if insurance does not pay, I would go to the website (saxenda.com) and look for a coupon.

## 2023-01-13 NOTE — PROGRESS NOTES
Sofy is a 22 year old who is being evaluated via a billable video visit.      How would you like to obtain your AVS? MyChart  If the video visit is dropped, the invitation should be resent by: Text to cell phone: [phone]  Will anyone else be joining your video visit? No    Obesity -   Evaluation and treatment:    We discussed diet and exercise.    Discussed various FDA approved medicatons and off label medications.    She would like to try Saxenda. I rx'd after discussing pros and cons.    Wt Readings from Last 5 Encounters:   09/28/22 83.9 kg (185 lb)   06/18/22 76.2 kg (168 lb)   07/22/21 76.2 kg (168 lb)   07/18/21 72.6 kg (160 lb)   07/01/21 75.3 kg (166 lb)     Subclinical hypothyroidism -    Advised to check TSH yearly.    TSH   Date Value Ref Range Status   12/09/2022 7.32 (H) 0.40 - 4.00 mU/L Final   06/18/2021 2.17 0.40 - 4.00 mU/L Final     Free T4   Date Value Ref Range Status   12/09/2022 0.79 0.76 - 1.46 ng/dL Final     As an aside, she requested TB gold test for her school. I ordered it for her.    Exam:    There were no vitals taken for this visit.    Gen: On video, healthy appearing female in no acute distress    Assessment and Plan - Decision Making    1. Class 1 obesity without serious comorbidity in adult, unspecified BMI, unspecified obesity type    Per HPI    - liraglutide - Weight Management (SAXENDA) 18 MG/3ML pen; Inject 3 mg Subcutaneous daily  Dispense: 15 mL; Refill: 3  - insulin pen needle (32G X 6 MM) 32G X 6 MM miscellaneous; To be used daily with Saxenda.  Dispense: 100 each; Refill: 1    2. Screening examination for pulmonary tuberculosis    Per HPI    - Quantiferon TB Gold Plus; Future    3. Subclinical hyperthyroidism    Per HPI    - TSH with free T4 reflex; Future      Written instructions given as follows:    Patient Instructions   Use Saxenda which is an injectable weight loss medication. The most common side effect are GI symptoms. This can be minimized by increasing  the dose weekly. Start with 0.6 mg, then increase to 1.2 mg, then 1.8 mg, then 2.4 mg, then 3 mg daily. If GI symptoms occur, may delay going to the next dose by another week. There is increased risk of pancreatitis, gall stones, suicide thoughts and increased heart rate. There was an increase in thyroid cancer in rats but this has not been determined in humans. The cardiovascular effects of Saxenda have not been determined. Other possible side effects are low glucose, dizziness and headaches. It is expensive so if insurance does not pay, I would go to the website (saxenda.com) and look for a coupon.          Video-Visit Details    Type of service:  Video Visit     Originating Location (pt. Location): Home    Distant Location (provider location):  On-site  Platform used for Video Visit: Amy

## 2023-01-16 NOTE — TELEPHONE ENCOUNTER
Rerouting to PA pool. This medication needs a PA. Please call and let [facility] know any denial/approval updates.     Thank you for all you do,    Kayy Bentley, Pennie  [facility]  On Behalf of [facility]

## 2023-01-18 NOTE — TELEPHONE ENCOUNTER
PA Initiation    Medication: Saxenda 18mg/3ml PA INITIATED  Insurance Company: [employer] - Phone [phone] Fax [phone]  Pharmacy Filling the Rx: [place], [place] - [address], [address]  Filling Pharmacy Phone: [phone]  Filling Pharmacy Fax:    Start Date: 1/18/2023    Central Prior Authorization Team   Phone: [phone]

## 2023-01-19 NOTE — TELEPHONE ENCOUNTER
Prior Authorization Approval    Authorization Effective Date: 1/18/2023  Authorization Expiration Date: 5/17/2023  Medication: Saxenda 18mg/3ml PA APPROVED  Approved Dose/Quantity: Max of 4 fills 15ml/30 DS  Reference #:     Insurance Company: [employer] - Phone [phone] Fax [phone]  Expected CoPay:       CoPay Card Available:      Foundation Assistance Needed:    Which Pharmacy is filling the prescription (Not needed for infusion/clinic administered): [facility] [id number] [facility], [address]  Pharmacy Notified: Yes  Patient Notified: Comment:  Pharmacy will notify patient.

## 2023-02-09 ENCOUNTER — LAB (OUTPATIENT)
Dept: LAB | Facility: CLINIC | Age: 23
End: 2023-02-09
Payer: COMMERCIAL

## 2023-02-09 DIAGNOSIS — Z11.1 SCREENING EXAMINATION FOR PULMONARY TUBERCULOSIS: ICD-10-CM

## 2023-02-09 PROCEDURE — 36415 COLL VENOUS BLD VENIPUNCTURE: CPT

## 2023-02-09 PROCEDURE — 86481 TB AG RESPONSE T-CELL SUSP: CPT

## 2023-02-13 LAB
GAMMA INTERFERON BACKGROUND BLD IA-ACNC: 0 IU/ML
M TB IFN-G BLD-IMP: NEGATIVE
M TB IFN-G CD4+ BCKGRND COR BLD-ACNC: 10 IU/ML
MITOGEN IGNF BCKGRD COR BLD-ACNC: 0 IU/ML
MITOGEN IGNF BCKGRD COR BLD-ACNC: 0 IU/ML
QUANTIFERON MITOGEN: 10 IU/ML
QUANTIFERON NIL TUBE: 0 IU/ML
QUANTIFERON TB1 TUBE: 0 IU/ML
QUANTIFERON TB2 TUBE: 0

## 2023-03-31 ENCOUNTER — OFFICE VISIT (OUTPATIENT)
Dept: URGENT CARE | Facility: URGENT CARE | Age: 23
End: 2023-03-31
Payer: COMMERCIAL

## 2023-03-31 VITALS
RESPIRATION RATE: 16 BRPM | DIASTOLIC BLOOD PRESSURE: 85 MMHG | BODY MASS INDEX: 34.39 KG/M2 | SYSTOLIC BLOOD PRESSURE: 130 MMHG | WEIGHT: 182 LBS | TEMPERATURE: 97.7 F | OXYGEN SATURATION: 97 % | HEART RATE: 105 BPM

## 2023-03-31 DIAGNOSIS — K21.00 GASTROESOPHAGEAL REFLUX DISEASE WITH ESOPHAGITIS WITHOUT HEMORRHAGE: Primary | ICD-10-CM

## 2023-03-31 PROCEDURE — 99213 OFFICE O/P EST LOW 20 MIN: CPT | Performed by: PHYSICIAN ASSISTANT

## 2023-03-31 ASSESSMENT — PAIN SCALES - GENERAL: PAINLEVEL: NO PAIN (0)

## 2023-03-31 NOTE — PROGRESS NOTES
Assessment & Plan     1. Gastroesophageal reflux disease with esophagitis without hemorrhage      Take omeprazole 20 mg daily until seen by PCP.                 EDILIA Beauchamp [facility]    Yazmin Goetz is a 23 year old female who presents to clinic today for the following health issues:  Chief Complaint   Patient presents with     Nausea     Off and on x 1 week. LMP 3/13/23.     Vomiting     A few times for the last week     Gastrophageal Reflux     Abdominal Pain     Off and on     HPI    Here for nausea, acid reflux, belching, abdominal pain. Am's are the worst vomiting x2 at that time. No heartburn. Drinks much soda with caffeine. She never had this before the last week. No blackness or redness to stools. Omeprazole 20 mg she took 2x over the past 2 days. None today.           Review of Systems        Objective    /85   Pulse 105   Temp 97.7  F (36.5  C) (Tympanic)   Resp 16   Wt 82.6 kg (182 lb)   LMP 03/13/2023 (Exact Date)   SpO2 97%   BMI 34.39 kg/m    Physical Exam  Abdominal:      General: Abdomen is flat. Bowel sounds are normal.      Palpations: Abdomen is soft.      Tenderness: There is abdominal tenderness in the epigastric area. There is no right CVA tenderness or left CVA tenderness.

## 2023-04-17 ENCOUNTER — OFFICE VISIT (OUTPATIENT)
Dept: FAMILY MEDICINE | Facility: CLINIC | Age: 23
End: 2023-04-17
Payer: COMMERCIAL

## 2023-04-17 VITALS
RESPIRATION RATE: 16 BRPM | HEART RATE: 99 BPM | TEMPERATURE: 98.3 F | DIASTOLIC BLOOD PRESSURE: 80 MMHG | WEIGHT: 186.4 LBS | SYSTOLIC BLOOD PRESSURE: 134 MMHG | OXYGEN SATURATION: 98 % | HEIGHT: 61 IN | BODY MASS INDEX: 35.19 KG/M2

## 2023-04-17 DIAGNOSIS — H92.02 OTALGIA, LEFT: ICD-10-CM

## 2023-04-17 DIAGNOSIS — J02.9 SORE THROAT: Primary | ICD-10-CM

## 2023-04-17 LAB
DEPRECATED S PYO AG THROAT QL EIA: NEGATIVE
FLUAV AG SPEC QL IA: NEGATIVE
FLUBV AG SPEC QL IA: NEGATIVE
GROUP A STREP BY PCR: NOT DETECTED

## 2023-04-17 PROCEDURE — 87804 INFLUENZA ASSAY W/OPTIC: CPT | Performed by: FAMILY MEDICINE

## 2023-04-17 PROCEDURE — U0005 INFEC AGEN DETEC AMPLI PROBE: HCPCS | Performed by: FAMILY MEDICINE

## 2023-04-17 PROCEDURE — 87651 STREP A DNA AMP PROBE: CPT | Performed by: FAMILY MEDICINE

## 2023-04-17 PROCEDURE — 99213 OFFICE O/P EST LOW 20 MIN: CPT | Mod: CS | Performed by: FAMILY MEDICINE

## 2023-04-17 PROCEDURE — U0003 INFECTIOUS AGENT DETECTION BY NUCLEIC ACID (DNA OR RNA); SEVERE ACUTE RESPIRATORY SYNDROME CORONAVIRUS 2 (SARS-COV-2) (CORONAVIRUS DISEASE [COVID-19]), AMPLIFIED PROBE TECHNIQUE, MAKING USE OF HIGH THROUGHPUT TECHNOLOGIES AS DESCRIBED BY CMS-2020-01-R: HCPCS | Performed by: FAMILY MEDICINE

## 2023-04-17 NOTE — PROGRESS NOTES
"  Assessment & Plan     Sore throat  Most likely a viral pharyngitis  Encouraged good hydration and discussed signs/symptoms of dehydration.  OTC analgesic and saline gargles recommended.  Will contact with results of culture when available.  Recheck if not improving as expected.    - Streptococcus A Rapid Screen w/Reflex to PCR - Clinic Collect  - Influenza A/B antigen  - Symptomatic COVID-19 Virus (Coronavirus) by PCR Nasopharyngeal  - Group A Streptococcus PCR Throat Swab    Otalgia, left    -Extension of oropharyngeal pain by the eustachian tube.  Reassurance     BMI:   Estimated body mass index is 35.22 kg/m  as calculated from the following:    Height as of this encounter: 1.549 m (5' 1\").    Weight as of this encounter: 84.6 kg (186 lb 6.4 oz).   Weight management plan: Discussed healthy diet and exercise guidelines      Logan Mena MD  [facility] JORGE Goetz is a 23 year old, presenting for the following health issues:  Pharyngitis         View : No data to display.              History of Present Illness       Reason for visit:  Sore throat, left ear pain, hard to talk  Symptom onset:  Today    She eats 2-3 servings of fruits and vegetables daily.She consumes 0 sweetened beverage(s) daily.She exercises with enough effort to increase her heart rate 30 to 60 minutes per day.  She exercises with enough effort to increase her heart rate 3 or less days per week. She is missing 1 dose(s) of medications per week.       Acute Illness  Acute illness concerns: Sore throat; status post tonsillectomy at age 5   Onset/Duration: Last night   Symptoms:  Fever: No  Chills/Sweats: YES- more colder than usual   Headache (location?): No  Sinus Pressure: YES  Conjunctivitis:  No  Ear Pain: YES: left  Rhinorrhea: No  Congestion: No  Sore Throat: YES  Cough: no  Wheeze: No  Decreased Appetite: YES- since on a specific kind of medication hard to determine if it's due to not being sick " "   Nausea: No  Vomiting: No  Diarrhea: No  Dysuria/Freq.: No  Dysuria or Hematuria: No  Fatigue/Achiness: YES  Sick/Strep Exposure: Unsure      Therapies tried and outcome: Tylenol for pain    Review of Systems   Constitutional, cardiovascular, pulmonary, gi and gu systems are negative, except as otherwise noted.      Objective    /80 (BP Location: Left arm, Patient Position: Sitting, Cuff Size: Adult Regular)   Pulse 99   Temp 98.3  F (36.8  C) (Oral)   Resp 16   Ht 1.549 m (5' 1\")   Wt 84.6 kg (186 lb 6.4 oz)   LMP 03/13/2023 (Exact Date)   SpO2 98%   BMI 35.22 kg/m    Body mass index is 35.22 kg/m .  Physical Exam   GENERAL: healthy, alert and no distress  HENT: ear canals and TM's normal, oropharynx with minimal erythema, no tonsils  RESP: lungs clear to auscultation - no rales, rhonchi or wheezes  CV: regular rate and rhythm,  no murmur, click or rub, no peripheral edema         "

## 2023-04-18 LAB — SARS-COV-2 RNA RESP QL NAA+PROBE: NEGATIVE

## 2023-04-28 ENCOUNTER — MYC MEDICAL ADVICE (OUTPATIENT)
Dept: FAMILY MEDICINE | Facility: CLINIC | Age: 23
End: 2023-04-28

## 2023-04-28 DIAGNOSIS — R41.840 ATTENTION DEFICIT: Primary | ICD-10-CM

## 2023-04-28 RX ORDER — DEXTROAMPHETAMINE SACCHARATE, AMPHETAMINE ASPARTATE, DEXTROAMPHETAMINE SULFATE AND AMPHETAMINE SULFATE 2.5; 2.5; 2.5; 2.5 MG/1; MG/1; MG/1; MG/1
10 TABLET ORAL DAILY
Qty: 30 TABLET | Refills: 0 | Status: SHIPPED | OUTPATIENT
Start: 2023-04-28 | End: 2023-05-12

## 2023-05-12 ENCOUNTER — VIRTUAL VISIT (OUTPATIENT)
Dept: FAMILY MEDICINE | Facility: CLINIC | Age: 23
End: 2023-05-12
Payer: COMMERCIAL

## 2023-05-12 DIAGNOSIS — E03.8 SUBCLINICAL HYPOTHYROIDISM: Primary | ICD-10-CM

## 2023-05-12 DIAGNOSIS — E66.811 CLASS 1 OBESITY WITHOUT SERIOUS COMORBIDITY IN ADULT, UNSPECIFIED BMI, UNSPECIFIED OBESITY TYPE: ICD-10-CM

## 2023-05-12 DIAGNOSIS — D64.9 ANEMIA, UNSPECIFIED TYPE: ICD-10-CM

## 2023-05-12 PROCEDURE — 99213 OFFICE O/P EST LOW 20 MIN: CPT | Mod: VID | Performed by: FAMILY MEDICINE

## 2023-05-12 NOTE — PROGRESS NOTES
Sofy is a 23 year old who is being evaluated via a billable video visit.      How would you like to obtain your AVS? MyChart  If the video visit is dropped, the invitation should be resent by: Text to cell phone: [phone]  Will anyone else be joining your video visit? No      Obesity -   Evaluation and treatment:    We discussed diet and exercise.    Saxenda 3 mg is working well without side effects.   Continue same tx.    She reports current home weight: 180#     Wt Readings from Last 5 Encounters:   04/17/23 84.6 kg (186 lb 6.4 oz)   03/31/23 82.6 kg (182 lb)   09/28/22 83.9 kg (185 lb)   06/18/22 76.2 kg (168 lb)   07/22/21 76.2 kg (168 lb)     Subclinical hypothyroidism -    Advised to check TSH periodically.    TSH   Date Value Ref Range Status   12/09/2022 7.32 (H) 0.40 - 4.00 mU/L Final   06/18/2021 2.17 0.40 - 4.00 mU/L Final     Free T4   Date Value Ref Range Status   12/09/2022 0.79 0.76 - 1.46 ng/dL Final     Anemia - she has heavy periods.  Evaluation and treatment:    She just started taking her iron.   We will check CBC and go from there.      Exam:    LMP 03/13/2023 (Exact Date)     Gen: On video, healthy appearing female in no acute distress      Assessment and Plan - Decision Making    1. Class 1 obesity without serious comorbidity in adult, unspecified BMI, unspecified obesity type    Per HPI    - liraglutide - Weight Management (SAXENDA) 18 MG/3ML pen; Inject 3 mg Subcutaneous daily  Dispense: 90 mL; Refill: 1  - Basic metabolic panel  (Ca, Cl, CO2, Creat, Gluc, K, Na, BUN); Future    2. Subclinical hypothyroidism    Per HPI    - TSH with free T4 reflex; Future    3. Anemia, unspecified type    Per HPI    - CBC with platelets and differential; Future          Video-Visit Details    Type of service:  Video Visit     Originating Location (pt. Location): Home    Distant Location (provider location):  On-site  Platform used for Video Visit: Amy

## 2023-06-18 ENCOUNTER — LAB (OUTPATIENT)
Dept: LAB | Facility: CLINIC | Age: 23
End: 2023-06-18
Payer: COMMERCIAL

## 2023-06-18 DIAGNOSIS — E66.811 CLASS 1 OBESITY WITHOUT SERIOUS COMORBIDITY IN ADULT, UNSPECIFIED BMI, UNSPECIFIED OBESITY TYPE: ICD-10-CM

## 2023-06-18 DIAGNOSIS — E03.8 SUBCLINICAL HYPOTHYROIDISM: ICD-10-CM

## 2023-06-18 DIAGNOSIS — D64.9 ANEMIA, UNSPECIFIED TYPE: ICD-10-CM

## 2023-06-18 LAB
ANION GAP SERPL CALCULATED.3IONS-SCNC: 10 MMOL/L (ref 7–15)
BASOPHILS # BLD AUTO: 0 10E3/UL (ref 0–0.2)
BASOPHILS NFR BLD AUTO: 0 %
BUN SERPL-MCNC: 9.7 MG/DL (ref 6–20)
CALCIUM SERPL-MCNC: 8.7 MG/DL (ref 8.6–10)
CHLORIDE SERPL-SCNC: 108 MMOL/L (ref 98–107)
CREAT SERPL-MCNC: 0.62 MG/DL (ref 0.51–0.95)
DEPRECATED HCO3 PLAS-SCNC: 22 MMOL/L (ref 22–29)
EOSINOPHIL # BLD AUTO: 0.2 10E3/UL (ref 0–0.7)
EOSINOPHIL NFR BLD AUTO: 3 %
ERYTHROCYTE [DISTWIDTH] IN BLOOD BY AUTOMATED COUNT: 13.1 % (ref 10–15)
GFR SERPL CREATININE-BSD FRML MDRD: >90 ML/MIN/1.73M2
GLUCOSE SERPL-MCNC: 108 MG/DL (ref 70–99)
HCT VFR BLD AUTO: 33.6 % (ref 35–47)
HGB BLD-MCNC: 10.5 G/DL (ref 11.7–15.7)
IMM GRANULOCYTES # BLD: 0 10E3/UL
IMM GRANULOCYTES NFR BLD: 0 %
LYMPHOCYTES # BLD AUTO: 2.3 10E3/UL (ref 0.8–5.3)
LYMPHOCYTES NFR BLD AUTO: 40 %
MCH RBC QN AUTO: 27.2 PG (ref 26.5–33)
MCHC RBC AUTO-ENTMCNC: 31.3 G/DL (ref 31.5–36.5)
MCV RBC AUTO: 87 FL (ref 78–100)
MONOCYTES # BLD AUTO: 0.4 10E3/UL (ref 0–1.3)
MONOCYTES NFR BLD AUTO: 7 %
NEUTROPHILS # BLD AUTO: 2.9 10E3/UL (ref 1.6–8.3)
NEUTROPHILS NFR BLD AUTO: 50 %
PLATELET # BLD AUTO: 318 10E3/UL (ref 150–450)
POTASSIUM SERPL-SCNC: 4.1 MMOL/L (ref 3.4–5.3)
RBC # BLD AUTO: 3.86 10E6/UL (ref 3.8–5.2)
SODIUM SERPL-SCNC: 140 MMOL/L (ref 136–145)
T4 FREE SERPL-MCNC: 0.84 NG/DL (ref 0.9–1.7)
TSH SERPL DL<=0.005 MIU/L-ACNC: 4.93 UIU/ML (ref 0.3–4.2)
WBC # BLD AUTO: 5.8 10E3/UL (ref 4–11)

## 2023-06-18 PROCEDURE — 84443 ASSAY THYROID STIM HORMONE: CPT

## 2023-06-18 PROCEDURE — 85025 COMPLETE CBC W/AUTO DIFF WBC: CPT

## 2023-06-18 PROCEDURE — 84439 ASSAY OF FREE THYROXINE: CPT

## 2023-06-18 PROCEDURE — 36415 COLL VENOUS BLD VENIPUNCTURE: CPT

## 2023-06-18 PROCEDURE — 80048 BASIC METABOLIC PNL TOTAL CA: CPT

## 2023-06-19 ENCOUNTER — TELEPHONE (OUTPATIENT)
Dept: FAMILY MEDICINE | Facility: CLINIC | Age: 23
End: 2023-06-19
Payer: COMMERCIAL

## 2023-06-19 NOTE — TELEPHONE ENCOUNTER
Tom Quarles MD  [facility]  Please call patient. Hemoglobin lower and thyroid under treatment.  I'd like a follow-up telephone in the next couple weeks to discuss labs and start medications. Will add on mid AM next week if needed.     Patient:   Sofy Stringer   [phone] (home)     Provider:   Tom Quarles MD MD   Please call/evisit with questions or concerns.

## 2023-06-19 NOTE — TELEPHONE ENCOUNTER
Called patient and read providers note. Patient understands message and verbalizes good understanding of plan of care. Patient prefers an in person appointment.    Future Appointments 6/19/2023 - 12/16/2023      Date Visit Type Length Department Provider     6/27/2023 11:00 AM OFFICE VISIT 30 min [facility] Tom Quarles MD    Location Instructions:     [facility] is located at [address] [place] [address], just north of the intersection with [facility]. The patient parking lot and entrance is on the building s north side.                 Liz Solis RN

## 2023-06-27 ENCOUNTER — OFFICE VISIT (OUTPATIENT)
Dept: FAMILY MEDICINE | Facility: CLINIC | Age: 23
End: 2023-06-27
Payer: COMMERCIAL

## 2023-06-27 VITALS
OXYGEN SATURATION: 97 % | WEIGHT: 183 LBS | TEMPERATURE: 98.7 F | RESPIRATION RATE: 20 BRPM | HEIGHT: 61 IN | DIASTOLIC BLOOD PRESSURE: 79 MMHG | BODY MASS INDEX: 34.55 KG/M2 | SYSTOLIC BLOOD PRESSURE: 131 MMHG | HEART RATE: 104 BPM

## 2023-06-27 DIAGNOSIS — E03.9 HYPOTHYROIDISM, UNSPECIFIED TYPE: ICD-10-CM

## 2023-06-27 DIAGNOSIS — D64.9 ANEMIA, UNSPECIFIED TYPE: Primary | ICD-10-CM

## 2023-06-27 PROCEDURE — 99214 OFFICE O/P EST MOD 30 MIN: CPT | Performed by: FAMILY MEDICINE

## 2023-06-27 RX ORDER — LEVOTHYROXINE SODIUM 25 UG/1
25 TABLET ORAL DAILY
Qty: 90 TABLET | Refills: 1 | Status: SHIPPED | OUTPATIENT
Start: 2023-06-27 | End: 2024-05-03

## 2023-06-27 RX ORDER — FERROUS SULFATE 325(65) MG
325 TABLET ORAL
Qty: 90 TABLET | Refills: 3 | Status: SHIPPED | OUTPATIENT
Start: 2023-06-27 | End: 2024-05-21

## 2023-06-27 ASSESSMENT — PAIN SCALES - GENERAL: PAINLEVEL: NO PAIN (0)

## 2023-06-27 NOTE — PROGRESS NOTES
"  ASSESSMENT / PLAN:  (D64.9) Anemia, unspecified type  (primary encounter diagnosis)  Comment: likely from menses  Plan: ferrous sulfate (FEROSUL) 325 (65 Fe) MG tablet        Increase iron/meat in diet and protein. Start iron pill. Patient willing to restart ocp if not improving. Expected course and warning signs reviewed. Recheck in 3 months  Sooner if worse/new issues.    (E03.9) Hypothyroidism, unspecified type  Comment: needs help clinically  Plan: levothyroxine (SYNTHROID/LEVOTHROID) 25 MCG         tablet        Start medication. Reveiwed risks and side effects of medication  Recheck in 3 months      Subjective   Sofy is a 23 year old, presenting for the following health issues:  RECHECK (Lab results)  follow-up labs - anemia, obesity, hyperglycemia and hypothyroidism.   Running colder. Some dry skin. Fatigued. Going to school for MA- done in 6months.   Dating not currently. Emotionally doing ok.   Sleep overall ok. Anemia worse after menses.  Heavy menses- not currently on ocp - was helpful.   Iron supplement over the counter.   Emotionally doing ok. Some meat.   Decreased meat.       6/27/2023    10:48 AM   Additional Questions   Roomed by KAREN Flood CMA     History of Present Illness       Hypothyroidism:     Since last visit, patient describes the following symptoms::  None    She eats 2-3 servings of fruits and vegetables daily.She consumes 0 sweetened beverage(s) daily.She exercises with enough effort to increase her heart rate 20 to 29 minutes per day.  She exercises with enough effort to increase her heart rate 4 days per week.   She is taking medications regularly.             Objective    Physical Exam   /79   Pulse 104   Temp 98.7  F (37.1  C) (Oral)   Resp 20   Ht 1.549 m (5' 1\")   Wt 83 kg (183 lb)   SpO2 97%   BMI 34.58 kg/m     GENERAL: healthy, alert and no distress  EYES: Eyes grossly normal to inspection, PERRL and conjunctivae and sclerae normal  HENT: ear canals and TM's " normal, nose and mouth without ulcers or lesions  NECK: no adenopathy, no asymmetry, masses, or scars and thyroid normal to palpation  RESP: lungs clear to auscultation - no rales, rhonchi or wheezes  CV: regular rate and rhythm, normal S1 S2, no S3 or S4, no murmur, click or rub, no peripheral edema and peripheral pulses strong  ABDOMEN: soft, nontender, no hepatosplenomegaly, no masses and bowel sounds normal  MS: no gross musculoskeletal defects noted, no edema  PSYCH: mentation appears normal, affect normal/bright

## 2023-06-28 ENCOUNTER — MYC MEDICAL ADVICE (OUTPATIENT)
Dept: FAMILY MEDICINE | Facility: CLINIC | Age: 23
End: 2023-06-28
Payer: COMMERCIAL

## 2023-08-06 ENCOUNTER — TRANSFERRED RECORDS (OUTPATIENT)
Dept: MULTI SPECIALTY CLINIC | Facility: CLINIC | Age: 23
End: 2023-08-06

## 2023-08-06 LAB — RETINOPATHY: NORMAL

## 2023-10-28 ENCOUNTER — MYC MEDICAL ADVICE (OUTPATIENT)
Dept: FAMILY MEDICINE | Facility: CLINIC | Age: 23
End: 2023-10-28
Payer: COMMERCIAL

## 2023-11-01 ENCOUNTER — OFFICE VISIT (OUTPATIENT)
Dept: OBGYN | Facility: CLINIC | Age: 23
End: 2023-11-01
Payer: COMMERCIAL

## 2023-11-01 VITALS
TEMPERATURE: 98.8 F | RESPIRATION RATE: 18 BRPM | BODY MASS INDEX: 35.38 KG/M2 | HEART RATE: 86 BPM | SYSTOLIC BLOOD PRESSURE: 105 MMHG | DIASTOLIC BLOOD PRESSURE: 68 MMHG | HEIGHT: 61 IN | WEIGHT: 187.4 LBS

## 2023-11-01 DIAGNOSIS — Z01.419 WOMEN'S ANNUAL ROUTINE GYNECOLOGICAL EXAMINATION: Primary | ICD-10-CM

## 2023-11-01 DIAGNOSIS — Z11.3 SCREEN FOR STD (SEXUALLY TRANSMITTED DISEASE): ICD-10-CM

## 2023-11-01 DIAGNOSIS — Z12.4 CERVICAL CANCER SCREENING: ICD-10-CM

## 2023-11-01 DIAGNOSIS — N93.9 ABNORMAL UTERINE BLEEDING: ICD-10-CM

## 2023-11-01 PROCEDURE — 87591 N.GONORRHOEAE DNA AMP PROB: CPT | Performed by: OBSTETRICS & GYNECOLOGY

## 2023-11-01 PROCEDURE — 87491 CHLMYD TRACH DNA AMP PROBE: CPT | Performed by: OBSTETRICS & GYNECOLOGY

## 2023-11-01 PROCEDURE — G0145 SCR C/V CYTO,THINLAYER,RESCR: HCPCS | Performed by: OBSTETRICS & GYNECOLOGY

## 2023-11-01 PROCEDURE — 99385 PREV VISIT NEW AGE 18-39: CPT | Performed by: OBSTETRICS & GYNECOLOGY

## 2023-11-01 PROCEDURE — 99214 OFFICE O/P EST MOD 30 MIN: CPT | Mod: 25 | Performed by: OBSTETRICS & GYNECOLOGY

## 2023-11-01 RX ORDER — ACETAMINOPHEN AND CODEINE PHOSPHATE 120; 12 MG/5ML; MG/5ML
0.35 SOLUTION ORAL DAILY
Qty: 86 TABLET | Refills: 4 | Status: SHIPPED | OUTPATIENT
Start: 2023-11-01 | End: 2024-01-11

## 2023-11-01 RX ORDER — MULTIVIT-MIN/IRON/FOLIC ACID/K 18-600-40
500 CAPSULE ORAL DAILY
COMMUNITY

## 2023-11-01 ASSESSMENT — ENCOUNTER SYMPTOMS
WEAKNESS: 0
HEADACHES: 0
FEVER: 0
FREQUENCY: 0
ARTHRALGIAS: 0
HEMATURIA: 0
MYALGIAS: 0
NAUSEA: 0
NERVOUS/ANXIOUS: 0
JOINT SWELLING: 0
BREAST MASS: 0
HEARTBURN: 0
EYE PAIN: 0
SORE THROAT: 0
PARESTHESIAS: 0
DIZZINESS: 0
ABDOMINAL PAIN: 0
HEMATOCHEZIA: 0
CHILLS: 0
CONSTIPATION: 0
PALPITATIONS: 0
DYSURIA: 0
DIARRHEA: 0
SHORTNESS OF BREATH: 0
COUGH: 0

## 2023-11-01 NOTE — PROGRESS NOTES
SUBJECTIVE:   CC: Sofy Stringer is an 23 year old woman who presents for preventive health visit.     Contraception   Heavy periods   - Would like to restart the pill for heavy periods, upcoming vacation  - 2-3 days of heavy bleeding with each period, bleeds through pads, clothes, and sheets (recommended period panties for backup)   - Does skip a month every once in a while, does have acne and facial hair growth requiring shaving  - Has been on the pill previously, several years ago. Tried three different types, had extreme mood changes with one   - Pill helped with heavy periods, severe cramps  - Family hx of endometriosis, VTE    - Possible future interest in IUD - provided information brochure    Lumps in axilla   - Lump under left appeared about 3 weeks, right about a week ago   - Lumps feel hard, mobile, not painful, no redness, no discharge      Preventive health  - Follows with family practice for primary care  - Due for pap, chlamydia & gonorrhea screening today   - Up to date on vaccines except new Covid booster    Patient has been advised of split billing requirements and indicates understanding: Yes  Healthy Habits:  Answers submitted by the patient for this visit:  Annual Preventive Visit (Submitted on 11/1/2023)  Chief Complaint: Annual Exam:  Frequency of exercise:: 1 day/week  Getting at least 3 servings of Calcium per day:: Yes  Diet:: Regular (no restrictions)  Taking medications regularly:: No  Medication side effects:: Not applicable  Bi-annual eye exam:: Yes  Dental care twice a year:: Yes  Sleep apnea or symptoms of sleep apnea:: None  abdominal pain: No  Blood in stool: No  Blood in urine: No  chest pain: No  chills: No  congestion: No  constipation: No  cough: No  diarrhea: No  dizziness: No  ear pain: No  eye pain: No  nervous/anxious: No  fever: No  frequency: No  genital sores: No  headaches: No  hearing loss: No  heartburn: No  arthralgias: No  joint swelling: No  peripheral edema:  No  mood changes: No  myalgias: No  nausea: No  dysuria: No  palpitations: No  Skin sensation changes: No  sore throat: No  urgency: No  rash: No  shortness of breath: No  visual disturbance: No  weakness: No  pelvic pain: No  vaginal bleeding: No  vaginal discharge: No  tenderness: No  breast mass: No  breast discharge: No  Additional concerns today:: Yes  Exercise outside of work (Submitted on 11/1/2023)  Chief Complaint: Annual Exam:  Duration of exercise:: 15-30 minutes  Barriers to taking medications (Submitted on 11/1/2023)  Chief Complaint: Annual Exam:  Barriers to taking medications:: Problems remembering to take them, Other      Today's PHQ-2 Score:       11/1/2023    11:33 AM 4/28/2023    10:33 AM   PHQ-2 ( 1999 [employer])   Q1: Little interest or pleasure in doing things 0 0   Q2: Feeling down, depressed or hopeless 0 0   PHQ-2 Score 0 0   Q1: Little interest or pleasure in doing things Not at all Not at all   Q2: Feeling down, depressed or hopeless Not at all Not at all   PHQ-2 Score 0 0       Abuse: Current or Past(Physical, Sexual or Emotional)- No  Do you feel safe in your environment? Yes        Social History     Tobacco Use    Smoking status: Never    Smokeless tobacco: Never    Tobacco comments:     Lives in smoke free household   Substance Use Topics    Alcohol use: No     If you drink alcohol do you typically have >3 drinks per day or >7 drinks per week? No                     Reviewed orders with patient.  Reviewed health maintenance and updated orders accordingly - Yes    Current Outpatient Medications   Medication Sig Dispense Refill    Ascorbic Acid (VITAMIN C) 500 MG CAPS Take 500 mg by mouth daily Takes 2      cetirizine (ZYRTEC) 10 MG tablet Take 10 mg by mouth daily      ferrous sulfate (FEROSUL) 325 (65 Fe) MG tablet Take 1 tablet (325 mg) by mouth daily (with breakfast) 90 tablet 3    levothyroxine (SYNTHROID/LEVOTHROID) 25 MCG tablet Take 1 tablet (25 mcg) by mouth daily 90 tablet 1     "liraglutide - Weight Management (SAXENDA) 18 MG/3ML pen Inject 3 mg Subcutaneous daily 90 mL 1    MULTI VIT/FL OR None Entered      insulin pen needle (32G X 6 MM) 32G X 6 MM miscellaneous To be used daily with Saxenda. 100 each 1     Allergies   Allergen Reactions    Amoxicillin Hives    Penicillins Rash       Pertinent mammograms are reviewed under the imaging tab.  History of abnormal Pap smear: NO - age 21-29 PAP every 3 years recommended     Reviewed and updated as needed this visit by clinical staff   Tobacco  Allergies  Meds   Med Hx  Surg Hx  Fam Hx  Soc Hx        Reviewed and updated as needed this visit by Provider                 Past Medical History:   Diagnosis Date    Subclinical hypothyroidism       Past Surgical History:   Procedure Laterality Date    TONSILLECTOMY & ADENOIDECTOMY       OB History    Para Term  AB Living   0 0 0 0 0 0   SAB IAB Ectopic Multiple Live Births   0 0 0 0 0       ROS:  CONSTITUTIONAL: NEGATIVE for fever, chills, change in weight  INTEGUMENTARU/SKIN: NEGATIVE for worrisome rashes, moles or lesions, +acne and facial hair growth, lumps in axilla   EYES: NEGATIVE for vision changes or irritation  ENT: NEGATIVE for ear, mouth and throat problems  RESP: NEGATIVE for significant cough or SOB  BREAST: NEGATIVE for masses, tenderness or discharge  CV: NEGATIVE for chest pain, palpitations or peripheral edema  GI: NEGATIVE for nausea, abdominal pain, heartburn, or change in bowel habits  : NEGATIVE for unusual urinary or vaginal symptoms. AUB- see above  MUSCULOSKELETAL: NEGATIVE for significant arthralgias or myalgia  NEURO: NEGATIVE for weakness, dizziness or paresthesias  PSYCHIATRIC: NEGATIVE for changes in mood or affect    OBJECTIVE:   /68 (BP Location: Left arm, Patient Position: Chair, Cuff Size: Adult Large)   Pulse 86   Temp 98.8  F (37.1  C) (Tympanic)   Resp 18   Ht 1.549 m (5' 1\")   Wt 85 kg (187 lb 6.4 oz)   LMP 10/02/2023   BMI " 35.41 kg/m    EXAM:   GENERAL: healthy, alert and no distress  HEENT: Eyes grossly normal to inspection, conjunctivae and sclerae normal. Hearing intact on interview. Face grossly symmetric. Face and neck without ulcers, lesions, masses, or scars  RESP: breathing comfortably on room air  BREAST: normal without masses, tenderness or nipple discharge and no palpable axillary masses or adenopathy; acanthosis nigricans of axilla bilaterally   CV: regular rate, extremities warm and well-perfused   ABDOMEN: soft, nontender, no masses   (female): normal female external genitalia, normal urethral meatus, vaginal mucosa pink, moist, well rugated, and normal cervix/adnexa/uterus without masses or discharge  MS: no gross musculoskeletal defects noted, no edema  SKIN: no suspicious lesions or rashes, +mild acne  NEURO: Normal strength and tone, mentation intact and speech normal  PSYCH: mentation appears normal, affect appropriate      Diagnostic Test Results:  Labs ordered for future draw     ASSESSMENT/PLAN:   (Z01.419) Women's annual routine gynecological examination  (primary encounter diagnosis)  Comment: Age appropriate screening and preventive services provided.     (N93.9) Abnormal uterine bleeding  Comment: Hx of heavy, irregular periods, previously controlled on OCP, acne, facial hair growth, acanthosis nigricans suggest possible PCOS. Recommended restarting OCP for period control, progesterone-only given family hx of VTE. Labs ordered to r/o other causes of heavy bleeding and related symptoms.  Also discussed other progesterone-based options including Nexplanon, Depo-Provera injection, or Mirena.  She is not particular bothered by her clinical hyperandrogenism so did not go into treatment options for this.  Could consider metformin in the future.  Plan: norethindrone (MICRONOR) 0.35 MG tablet, 17 OH pregnenolone, DHEA sulfate, Estradiol, Follicle stimulating hormone, Hemoglobin A1c, Lipid panel reflex to direct LDL  "Non-fasting, Luteinizing Hormone, Progesterone, Prolactin, Testosterone Free and Total          (Z12.4) Cervical cancer screening  Plan: Pap screen only - recommended age 21 - 24 years       (Z11.3) Screen for STD (sexually transmitted disease)  Plan: Chlamydia trachomatis/Neisseria gonorrhoeae by PCR             Patient has been advised of split billing requirements and indicates understanding: Yes  COUNSELING:   Reviewed preventive health counseling, as reflected in patient instructions  Special attention given to:        Contraception, recommended COVID booster    Estimated body mass index is 35.41 kg/m  as calculated from the following:    Height as of this encounter: 1.549 m (5' 1\").    Weight as of this encounter: 85 kg (187 lb 6.4 oz).    Weight management plan: Patient working with PCP for weight management.    She reports that she has never smoked. She has never used smokeless tobacco.      Counseling Resources:  ATP IV Guidelines  Pooled Cohorts Equation Calculator  Breast Cancer Risk Calculator  BRCA-Related Cancer Risk Assessment: FHS-7 Tool  FRAX Risk Assessment  ICSI Preventive Guidelines  Dietary Guidelines for Americans, 2010  USDA's MyPlate  ASA Prophylaxis  Lung CA Screening    Patient was staffed with Dr. Freed.    Roxana Han MS3     "

## 2023-11-02 ENCOUNTER — TELEPHONE (OUTPATIENT)
Dept: FAMILY MEDICINE | Facility: CLINIC | Age: 23
End: 2023-11-02
Payer: COMMERCIAL

## 2023-11-02 LAB
C TRACH DNA SPEC QL PROBE+SIG AMP: NEGATIVE
N GONORRHOEA DNA SPEC QL NAA+PROBE: NEGATIVE

## 2023-11-02 NOTE — TELEPHONE ENCOUNTER
Prior Authorization Retail Medication Request    Medication/Dose: Saxenda 18mg/3ml  ICD code (if different than what is on RX):    Previously Tried and Failed:    Rationale:      Insurance Name:  [employer]  Insurance ID:  [id number]    Thank You  Margot Fagan [facility]  [phone]

## 2023-11-04 ENCOUNTER — LAB (OUTPATIENT)
Dept: LAB | Facility: CLINIC | Age: 23
End: 2023-11-04
Payer: COMMERCIAL

## 2023-11-04 DIAGNOSIS — E03.9 HYPOTHYROIDISM, UNSPECIFIED TYPE: ICD-10-CM

## 2023-11-04 DIAGNOSIS — D64.9 ANEMIA, UNSPECIFIED TYPE: ICD-10-CM

## 2023-11-04 DIAGNOSIS — N93.9 ABNORMAL UTERINE BLEEDING: ICD-10-CM

## 2023-11-04 LAB
ERYTHROCYTE [DISTWIDTH] IN BLOOD BY AUTOMATED COUNT: 14.4 % (ref 10–15)
HBA1C MFR BLD: 5.4 % (ref 0–5.6)
HCT VFR BLD AUTO: 35.3 % (ref 35–47)
HGB BLD-MCNC: 11 G/DL (ref 11.7–15.7)
MCH RBC QN AUTO: 27 PG (ref 26.5–33)
MCHC RBC AUTO-ENTMCNC: 31.2 G/DL (ref 31.5–36.5)
MCV RBC AUTO: 87 FL (ref 78–100)
PLATELET # BLD AUTO: 329 10E3/UL (ref 150–450)
RBC # BLD AUTO: 4.08 10E6/UL (ref 3.8–5.2)
WBC # BLD AUTO: 7 10E3/UL (ref 4–11)

## 2023-11-04 PROCEDURE — 99000 SPECIMEN HANDLING OFFICE-LAB: CPT

## 2023-11-04 PROCEDURE — 36415 COLL VENOUS BLD VENIPUNCTURE: CPT

## 2023-11-04 PROCEDURE — 84144 ASSAY OF PROGESTERONE: CPT

## 2023-11-04 PROCEDURE — 83002 ASSAY OF GONADOTROPIN (LH): CPT

## 2023-11-04 PROCEDURE — 84439 ASSAY OF FREE THYROXINE: CPT

## 2023-11-04 PROCEDURE — 82627 DEHYDROEPIANDROSTERONE: CPT

## 2023-11-04 PROCEDURE — 83001 ASSAY OF GONADOTROPIN (FSH): CPT

## 2023-11-04 PROCEDURE — 84146 ASSAY OF PROLACTIN: CPT

## 2023-11-04 PROCEDURE — 84143 ASSAY OF 17-HYDROXYPREGNENO: CPT | Mod: 90

## 2023-11-04 PROCEDURE — 84270 ASSAY OF SEX HORMONE GLOBUL: CPT

## 2023-11-04 PROCEDURE — 84403 ASSAY OF TOTAL TESTOSTERONE: CPT

## 2023-11-04 PROCEDURE — 80061 LIPID PANEL: CPT

## 2023-11-04 PROCEDURE — 82728 ASSAY OF FERRITIN: CPT

## 2023-11-04 PROCEDURE — 83036 HEMOGLOBIN GLYCOSYLATED A1C: CPT

## 2023-11-04 PROCEDURE — 85027 COMPLETE CBC AUTOMATED: CPT

## 2023-11-04 PROCEDURE — 82670 ASSAY OF TOTAL ESTRADIOL: CPT

## 2023-11-04 PROCEDURE — 84443 ASSAY THYROID STIM HORMONE: CPT

## 2023-11-05 LAB
BKR LAB AP GYN ADEQUACY: NORMAL
BKR LAB AP GYN INTERPRETATION: NORMAL
BKR LAB AP HPV REFLEX: NO
BKR LAB AP PREVIOUS ABNORMAL: NORMAL
CHOLEST SERPL-MCNC: 188 MG/DL
ESTRADIOL SERPL-MCNC: 47 PG/ML
FERRITIN SERPL-MCNC: 17 NG/ML (ref 6–175)
FSH SERPL IRP2-ACNC: 2.1 MIU/ML
HDLC SERPL-MCNC: 39 MG/DL
LDLC SERPL CALC-MCNC: 117 MG/DL
LH SERPL-ACNC: 2.6 MIU/ML
NONHDLC SERPL-MCNC: 149 MG/DL
PATH REPORT.COMMENTS IMP SPEC: NORMAL
PATH REPORT.COMMENTS IMP SPEC: NORMAL
PATH REPORT.RELEVANT HX SPEC: NORMAL
PROGEST SERPL-MCNC: 1.3 NG/ML
PROLACTIN SERPL 3RD IS-MCNC: 8 NG/ML (ref 5–23)
T4 FREE SERPL-MCNC: 0.76 NG/DL (ref 0.9–1.7)
TRIGL SERPL-MCNC: 158 MG/DL
TSH SERPL DL<=0.005 MIU/L-ACNC: 6.89 UIU/ML (ref 0.3–4.2)

## 2023-11-06 ENCOUNTER — MYC MEDICAL ADVICE (OUTPATIENT)
Dept: FAMILY MEDICINE | Facility: CLINIC | Age: 23
End: 2023-11-06
Payer: COMMERCIAL

## 2023-11-06 DIAGNOSIS — Z00.00 PREVENTATIVE HEALTH CARE: Primary | ICD-10-CM

## 2023-11-06 LAB
DHEA-S SERPL-MCNC: 244 UG/DL (ref 35–430)
SHBG SERPL-SCNC: 23 NMOL/L (ref 30–135)

## 2023-11-07 NOTE — TELEPHONE ENCOUNTER
Central Prior Authorization Team   Phone: [phone]    PA Initiation    Medication: SAXENDA 18 MG/3ML SC SOPN  Insurance Company: Other (see comments)Comment:  [facility]  Pharmacy Filling the Rx: [facility] [id number], [address]  Filling Pharmacy Phone: [phone]  Filling Pharmacy Fax:    Start Date: 11/7/2023

## 2023-11-08 LAB
TESTOST FREE SERPL-MCNC: 0.37 NG/DL
TESTOST SERPL-MCNC: 17 NG/DL (ref 8–60)

## 2023-11-08 NOTE — TELEPHONE ENCOUNTER
Prior Authorization Approval    Authorization Effective Date: 11/8/2023  Authorization Expiration Date: 11/7/2024  Medication: Saxenda 18mg/3ml-APPROVED  Reference #:     Insurance Company: Other (see comments)Comment:  [employer]  Which Pharmacy is filling the prescription (Not needed for infusion/clinic administered): [facility] [id number] [facility], [address]  Pharmacy Notified: Yes  Patient Notified: Instructed pharmacy to notify patient when script is ready to /ship.

## 2023-11-11 ENCOUNTER — LAB (OUTPATIENT)
Dept: LAB | Facility: CLINIC | Age: 23
End: 2023-11-11
Payer: COMMERCIAL

## 2023-11-11 DIAGNOSIS — Z00.00 PREVENTATIVE HEALTH CARE: ICD-10-CM

## 2023-11-11 LAB
ALBUMIN SERPL BCG-MCNC: 4.1 G/DL (ref 3.5–5.2)
ALP SERPL-CCNC: 81 U/L (ref 35–104)
ALT SERPL W P-5'-P-CCNC: 18 U/L (ref 0–50)
ANION GAP SERPL CALCULATED.3IONS-SCNC: 7 MMOL/L (ref 7–15)
AST SERPL W P-5'-P-CCNC: 19 U/L (ref 0–45)
BILIRUB SERPL-MCNC: 0.2 MG/DL
BUN SERPL-MCNC: 11.3 MG/DL (ref 6–20)
CALCIUM SERPL-MCNC: 9.1 MG/DL (ref 8.6–10)
CHLORIDE SERPL-SCNC: 104 MMOL/L (ref 98–107)
CREAT SERPL-MCNC: 0.69 MG/DL (ref 0.51–0.95)
DEPRECATED HCO3 PLAS-SCNC: 28 MMOL/L (ref 22–29)
EGFRCR SERPLBLD CKD-EPI 2021: >90 ML/MIN/1.73M2
GLUCOSE SERPL-MCNC: 91 MG/DL (ref 70–99)
POTASSIUM SERPL-SCNC: 4.2 MMOL/L (ref 3.4–5.3)
PROT SERPL-MCNC: 7.1 G/DL (ref 6.4–8.3)
SODIUM SERPL-SCNC: 139 MMOL/L (ref 135–145)

## 2023-11-11 PROCEDURE — 80053 COMPREHEN METABOLIC PANEL: CPT

## 2023-11-11 PROCEDURE — 36415 COLL VENOUS BLD VENIPUNCTURE: CPT

## 2023-11-21 LAB — 17OH-PREG SERPL-MCNC: 50 NG/DL

## 2023-12-04 DIAGNOSIS — E28.2 PCOS (POLYCYSTIC OVARIAN SYNDROME): Primary | ICD-10-CM

## 2023-12-04 RX ORDER — DROSPIRENONE AND ETHINYL ESTRADIOL 0.02-3(28)
1 KIT ORAL DAILY
Qty: 86 TABLET | Refills: 3 | Status: SHIPPED | OUTPATIENT
Start: 2023-12-04 | End: 2024-06-06

## 2023-12-05 NOTE — PROGRESS NOTES
Pt requesting rx for YARIEL be sent to [facility]   Pharmacy called    [facility]

## 2024-01-11 ENCOUNTER — OFFICE VISIT (OUTPATIENT)
Dept: OBGYN | Facility: CLINIC | Age: 24
End: 2024-01-11
Payer: COMMERCIAL

## 2024-01-11 VITALS
TEMPERATURE: 97.4 F | HEART RATE: 100 BPM | BODY MASS INDEX: 35.52 KG/M2 | DIASTOLIC BLOOD PRESSURE: 84 MMHG | WEIGHT: 188 LBS | SYSTOLIC BLOOD PRESSURE: 135 MMHG

## 2024-01-11 DIAGNOSIS — N92.6 MENSTRUAL IRREGULARITY: Primary | ICD-10-CM

## 2024-01-11 PROCEDURE — 99213 OFFICE O/P EST LOW 20 MIN: CPT | Performed by: OBSTETRICS & GYNECOLOGY

## 2024-01-11 NOTE — PROGRESS NOTES
Assessment & Plan     Menstrual irregularity  Reviewed criteria for PCOS. Does not have significant oligomenorrhea. Testosterone symptoms are mild, testosterone lab normal.   Reviewed ultrasound from 2020, report and images. One ovary appears to have peripheral follicles.     Discussed currently does not meet criteria for PCOS.   Does have painful periods, which could represent endometriosis, but well managed at this time.    Discussed the metabolic syndrome, which has some overlap with PCOS. HDL is low, triglycerides high, abdominal circumference high. Working on this with Saxenda. OCPs should not be relied on for birth control while on saxenda.     Discussed hypothyroidism could be contributing to mild menstrual irregularity. Working on managing this with PCP.     Pap smear was painful - discussed pain control options for next pap (liquid lidocaine,etc).    Review of the result(s) of each unique test - labs, ultrasound.              No follow-ups on file.    Michaela Reeves MD  [facility]    Yazmin Goetz is a 23 year old, presenting for the following health issues:  Consult (PCOS)      HPI   Presents for second opinion of PCOS  Was told she may have PCOS at her annual exam but then didn't need treatment.   Was started on OCPs, first on POP then transitioned to COCs.     Taking Saxenda intermittently x 6 months.     Has had some menstrual irregularity, mostly once a month, but variable.   Heavy and painful, especially at night.  Without ibuprofen, periods roll her up in a ball. First time ever that her periods have been manageable while being on this pill.   Menarche 11  OCPs are currently working well for managing her periods.   She is not currently using OCPs for contraception    Occ facial hair growth, but doesn't regard as excessive.   Scattered acne, treatable with benzoyl peroxide.   No hair loss over crown.     Grandmother might have had endometriosis, had a  hysterectomy for continued ovarian cysts.       Past Medical History:   Diagnosis Date    Subclinical hypothyroidism        Past Surgical History:   Procedure Laterality Date    TONSILLECTOMY & ADENOIDECTOMY  2002       Family History   Problem Relation Age of Onset    Thyroid Disease Father     Hypertension Maternal Grandmother     Lipids Maternal Grandmother     Skin Cancer Maternal Grandmother     Diabetes Maternal Grandmother     Hyperlipidemia Maternal Grandmother     Cancer - colorectal Maternal Grandfather     Cancer Maternal Grandfather     Colon Cancer Maternal Grandfather     Asthma Maternal Grandfather     Cerebrovascular Disease Other     Glaucoma No family hx of     Macular Degeneration No family hx of        Social History     Socioeconomic History    Marital status: Single     Spouse name: Not on file    Number of children: Not on file    Years of education: Not on file    Highest education level: Not on file   Occupational History    Not on file   Tobacco Use    Smoking status: Never    Smokeless tobacco: Never    Tobacco comments:     Lives in smoke free household   Vaping Use    Vaping Use: Never used   Substance and Sexual Activity    Alcohol use: No    Drug use: No    Sexual activity: Never   Other Topics Concern    Parent/sibling w/ CABG, MI or angioplasty before 65F 55M? No   Social History Narrative    ENVIRONMENTAL HISTORY: [place] The home is heated with a forced air and gas furnace. They do have central air conditioning. The patient's bedroom is furnished with carpeting in bedroom and fabric window coverings.  Pets inside the house include 1 dog(s). There is no history of cockroach or mice infestation. There is/are 1 smokers living in the house.  There is/are 0 who smoke ecigarettes/vape living in the house.The house does not have a damp basement.         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 She lives with her mother, grandmother, and brother.  Her mother is an RN.     Social Determinants of Health     Financial Resource Strain: Not on file   Food Insecurity: Not on file   Transportation Needs: Not on file   Physical Activity: Not on file   Stress: Not on file   Social Connections: Not on file   Interpersonal Safety: Not on file   Housing Stability: Not on file       Current Outpatient Medications   Medication    Ascorbic Acid (VITAMIN C) 500 MG CAPS    cetirizine (ZYRTEC) 10 MG tablet    drospirenone-ethinyl estradiol (YARIEL) 3-0.02 MG tablet    ferrous sulfate (FEROSUL) 325 (65 Fe) MG tablet    insulin pen needle (32G X 6 MM) 32G X 6 MM miscellaneous    levothyroxine (SYNTHROID/LEVOTHROID) 25 MCG tablet    liraglutide - Weight Management (SAXENDA) 18 MG/3ML pen    MULTI VIT/FL OR    norethindrone (MICRONOR) 0.35 MG tablet     No current facility-administered medications for this visit.          Allergies   Allergen Reactions    Amoxicillin Hives    Penicillins Rash           Review of Systems   Constitutional, HEENT, cardiovascular, pulmonary, gi and gu systems are negative, except as otherwise noted.      Objective    /84   Pulse 100   Temp 97.4  F (36.3  C)   Wt 85.3 kg (188 lb)   LMP 01/02/2024   BMI 35.52 kg/m    Body mass index is 35.52 kg/m .  Physical Exam   GENERAL: healthy, alert and no distress  MS: no gross musculoskeletal defects noted, no edema  SKIN: no suspicious lesions or rashes  PSYCH: mentation appears normal, affect normal/bright        Component      Latest Ref Rng 11/4/2023  5:08 PM   Free Testosterone Calculated      ng/dL 0.37    Testosterone Total      8 - 60 ng/dL 17    TSH      0.30 - 4.20 uIU/mL 6.89 (H)    17-OH Pregnenolone      <=226 ng/dL 50    DHEA Sulfate      35 - 430 ug/dL 244    Estradiol      pg/mL 47    FSH      mIU/mL 2.1    Luteinizing Hormone      mIU/mL 2.6    Progesterone      ng/mL 1.3    Prolactin      5 - 23 ng/mL 8    Sex Hormone  Binding Globulin      30 - 135 nmol/L 23 (L)       Legend:  (H) High  (L) Low          Component      Latest Ref Rng 11/4/2023  5:08 PM 11/11/2023  1:19 PM   Sodium      135 - 145 mmol/L  139    Potassium      3.4 - 5.3 mmol/L  4.2    Carbon Dioxide (CO2)      22 - 29 mmol/L  28    Anion Gap      7 - 15 mmol/L  7    Urea Nitrogen      6.0 - 20.0 mg/dL  11.3    Creatinine      0.51 - 0.95 mg/dL  0.69    GFR Estimate      >60 mL/min/1.73m2  >90    Calcium      8.6 - 10.0 mg/dL  9.1    Chloride      98 - 107 mmol/L  104    Glucose      70 - 99 mg/dL  91    Alkaline Phosphatase      35 - 104 U/L  81    AST      0 - 45 U/L  19    ALT      0 - 50 U/L  18    Protein Total      6.4 - 8.3 g/dL  7.1    Albumin      3.5 - 5.2 g/dL  4.1    Bilirubin Total      <=1.2 mg/dL  0.2    WBC      4.0 - 11.0 10e3/uL 7.0     RBC Count      3.80 - 5.20 10e6/uL 4.08     Hemoglobin      11.7 - 15.7 g/dL 11.0 (L)     Hematocrit      35.0 - 47.0 % 35.3     MCV      78 - 100 fL 87     MCH      26.5 - 33.0 pg 27.0     MCHC      31.5 - 36.5 g/dL 31.2 (L)     RDW      10.0 - 15.0 % 14.4     Platelet Count      150 - 450 10e3/uL 329     Cholesterol      <200 mg/dL 188     Triglycerides      <150 mg/dL 158 (H)     HDL Cholesterol      >=50 mg/dL 39 (L)     LDL Cholesterol Calculated      <=100 mg/dL 117 (H)     Non HDL Cholesterol      <130 mg/dL 149 (H)     TSH      0.30 - 4.20 uIU/mL 6.89 (H)     Hemoglobin A1C      0.0 - 5.6 % 5.4     T4 Free      0.90 - 1.70 ng/dL 0.76 (L)        Legend:  (L) Low  (H) High      Narrative & Impression   ULTRASOUND PELVIS DOPPLER   WITH TRANSVAGINAL IMAGING  8/22/2020 3:29  PM      HISTORY: Pelvic pain waxing and waning, evaluate for cyst/torsion.     COMPARISON: None.     TECHNIQUE: Endovaginal sonography was added to the transabdominal  scans. Grayscale, color Doppler, and Doppler spectral waveform  analysis performed.     FINDINGS:  No fibroids are evident. The uterus is 7.2 x 3.7 x 4.7 cm.  Endometrial stripe  measures 10 mm and is normal for patient's age and  menstrual status. The right ovary is normal. The left ovary is normal.  Doppler spectral waveform analysis demonstrates blood flow to both  ovaries. No adnexal masses are present. No free pelvic fluid is  present.                                                                      IMPRESSION: No acute process demonstrated.     HERO RAMIREZ MD

## 2024-01-18 ENCOUNTER — MYC MEDICAL ADVICE (OUTPATIENT)
Dept: OBGYN | Facility: CLINIC | Age: 24
End: 2024-01-18
Payer: COMMERCIAL

## 2024-02-06 ENCOUNTER — OFFICE VISIT (OUTPATIENT)
Dept: URGENT CARE | Facility: URGENT CARE | Age: 24
End: 2024-02-06
Payer: COMMERCIAL

## 2024-02-06 VITALS
SYSTOLIC BLOOD PRESSURE: 135 MMHG | TEMPERATURE: 97.9 F | BODY MASS INDEX: 36.16 KG/M2 | OXYGEN SATURATION: 99 % | WEIGHT: 191.38 LBS | HEART RATE: 104 BPM | DIASTOLIC BLOOD PRESSURE: 80 MMHG | RESPIRATION RATE: 16 BRPM

## 2024-02-06 DIAGNOSIS — N92.1 MENORRHAGIA WITH IRREGULAR CYCLE: Primary | ICD-10-CM

## 2024-02-06 LAB
BASOPHILS # BLD AUTO: 0 10E3/UL (ref 0–0.2)
BASOPHILS NFR BLD AUTO: 1 %
EOSINOPHIL # BLD AUTO: 0.4 10E3/UL (ref 0–0.7)
EOSINOPHIL NFR BLD AUTO: 5 %
ERYTHROCYTE [DISTWIDTH] IN BLOOD BY AUTOMATED COUNT: 13.3 % (ref 10–15)
HCT VFR BLD AUTO: 38.1 % (ref 35–47)
HGB BLD-MCNC: 12 G/DL (ref 11.7–15.7)
IMM GRANULOCYTES # BLD: 0 10E3/UL
IMM GRANULOCYTES NFR BLD: 0 %
LYMPHOCYTES # BLD AUTO: 3.4 10E3/UL (ref 0.8–5.3)
LYMPHOCYTES NFR BLD AUTO: 42 %
MCH RBC QN AUTO: 27.6 PG (ref 26.5–33)
MCHC RBC AUTO-ENTMCNC: 31.5 G/DL (ref 31.5–36.5)
MCV RBC AUTO: 88 FL (ref 78–100)
MONOCYTES # BLD AUTO: 0.5 10E3/UL (ref 0–1.3)
MONOCYTES NFR BLD AUTO: 6 %
NEUTROPHILS # BLD AUTO: 3.8 10E3/UL (ref 1.6–8.3)
NEUTROPHILS NFR BLD AUTO: 47 %
PLATELET # BLD AUTO: 354 10E3/UL (ref 150–450)
RBC # BLD AUTO: 4.35 10E6/UL (ref 3.8–5.2)
WBC # BLD AUTO: 8 10E3/UL (ref 4–11)

## 2024-02-06 PROCEDURE — 85025 COMPLETE CBC W/AUTO DIFF WBC: CPT

## 2024-02-06 PROCEDURE — 36415 COLL VENOUS BLD VENIPUNCTURE: CPT

## 2024-02-06 PROCEDURE — 99214 OFFICE O/P EST MOD 30 MIN: CPT

## 2024-02-06 PROCEDURE — 84439 ASSAY OF FREE THYROXINE: CPT

## 2024-02-06 PROCEDURE — 84443 ASSAY THYROID STIM HORMONE: CPT

## 2024-02-06 NOTE — PROGRESS NOTES
URGENT CARE  Assessment & Plan   Assessment:   Sofy Stringer is a 24 year old female who's clinical presentation today is consistent with:   1. Menorrhagia with irregular cycle  - CBC with platelets and differential; Future  - TSH with free T4 reflex; Future  - Ob/Gyn  Referral; Future  Plan:  Patient is well-appearing, vital signs stable and exam is within normal limits, additionally no signs of anemia today with lab work; will have patient follow-up with OB/GYN for further evaluation and treatment  Return precautions given and educated patient if symptoms worsen to present to the ED   No alarm signs or symptoms present   Differential Diagnoses for this patient's chief complaint that I considered include:  Polyps, fibroids, adenomyosis, Uterine atrophy, malignancy, adenomyosis, Hyperplasia, iatrogenic cause, trauma, Endometriosis, coagulopathy condition, Ovarian dysfunction - pcos, Hormonal imbalance   30 minutes spent by me (on the date of the encounter) doing chart review, history, physical exam, documentation, diagnostic testing, education/counseling and further activities per the noted    Patient is} agreeable to treatment plan and state they will follow-up if symptoms do not improve and/or if symptoms worsen   see patient's AVS 'monitor for' section for specific patient instructions given and discussed regarding what to watch for and when to follow up    BEN Chase [facility]      ______________________________________________________________________      Subjective     HPI: Sofy Stringer  is a 24 year old  female who presents today for evaluation the following concerns:   Patient presents today endorsing she is having feelings of lightheadedness and fatigue along with easily bruising.  Patient also states she has been menstruating for a month and a half.  Patient states she changed her birth control to Ree in the beginning of January and has been bleeding  since that.  Patient states she has not followed up with OB/GYN yet related to her menstrual bleeding.  Patient states she has a history of anemia.    Review of Systems:  Pertinent review of systems as reflected in HPI, otherwise negative.     Objective    Physical Exam:  Vitals:    02/06/24 1410   BP: 135/80   Pulse: 104   Resp: 16   Temp: 97.9  F (36.6  C)   SpO2: 99%   Weight: 86.8 kg (191 lb 6 oz)      General: Alert and oriented, no acute distress, afebrile, normotensive  Psy/mental status: Nonanxious, cooperative  SKIN: Intact, no open areas  ABDOMEN:  soft, non-tender, non-distended    No flank pain or CVA tenderness   Pelvic/ :   Deferred    LABS:   Results for orders placed or performed in visit on 02/06/24   CBC with platelets and differential     Status: None   Result Value Ref Range    WBC Count 8.0 4.0 - 11.0 10e3/uL    RBC Count 4.35 3.80 - 5.20 10e6/uL    Hemoglobin 12.0 11.7 - 15.7 g/dL    Hematocrit 38.1 35.0 - 47.0 %    MCV 88 78 - 100 fL    MCH 27.6 26.5 - 33.0 pg    MCHC 31.5 31.5 - 36.5 g/dL    RDW 13.3 10.0 - 15.0 %    Platelet Count 354 150 - 450 10e3/uL    % Neutrophils 47 %    % Lymphocytes 42 %    % Monocytes 6 %    % Eosinophils 5 %    % Basophils 1 %    % Immature Granulocytes 0 %    Absolute Neutrophils 3.8 1.6 - 8.3 10e3/uL    Absolute Lymphocytes 3.4 0.8 - 5.3 10e3/uL    Absolute Monocytes 0.5 0.0 - 1.3 10e3/uL    Absolute Eosinophils 0.4 0.0 - 0.7 10e3/uL    Absolute Basophils 0.0 0.0 - 0.2 10e3/uL    Absolute Immature Granulocytes 0.0 <=0.4 10e3/uL   CBC with platelets and differential     Status: None    Narrative    The following orders were created for panel order CBC with platelets and differential.  Procedure                               Abnormality         Status                     ---------                               -----------         ------                     CBC with platelets and d...[[id number]]                      Final result                 Please view results  for these tests on the individual orders.        ______________________________________________________________________    I explained my diagnostic considerations and recommendations to the patient, who voiced understanding and agreement with the treatment plan.   All questions were answered.   We discussed potential side effects, risks and benefits of any prescribed or recommended therapies, as well as expectations for response to treatments.  Please see AVS for any patient instructions & handouts given.   Patient was advised to contact the Nurse Care Line, their Primary Care provider, Urgent Care, or the Emergency Department if there are new or worsening symptoms, or call 911 for emergencies.

## 2024-02-06 NOTE — PATIENT INSTRUCTIONS
Diagnosis: heavy and irregular bleeding   Today we did:  Hgb - normal but sub-clinically low    12 today, 11 3 months ago, 10.5 7 months ago   Iron - pending   Thyroid - pending     Plan:   Follow up with obygn - referral placed     Monitor for:   Passing out   Worsening dizziness - go to ED     AUB:   Can be caused for many reasons   - hormonal imbalance   - polyps, fibroids, endometriosis    - ovarian dysfunction like PCOS    - trauma, medicines    - malignancy     Plan   Follow up with OBGYN for further evaluation and treatment options     Monitor for   Bleeding becomes heavy (soaking 1 pad or tampon every hour for 3 hours), passing clots   Increased abdominal pain or cramping   Fever of 101 F   Signs of anemia, such as pale skin, extreme fatigue or weakness, or shortness of breath  Dizziness or fainting     Dysfunctional/ abnormal Uterine Bleeding  Dysfunctional uterine bleeding is also called abnormal uterine bleeding. It's a condition in which bleeding is abnormal and occurs at unexpected times of the month. This happens because of changes in the hormones that help control a woman s menstrual cycle each month.   The bleeding may be heavier or lighter than normal. If you have heavy bleeding often, this can lead to a problem called anemia.   To treat dysfunctional uterine bleeding, you may take medicines. If these don t help, or if you have other symptoms or have reached menopause, you may need more testing and other treatments. Discuss all of your options with your provider.

## 2024-02-07 LAB
T4 FREE SERPL-MCNC: 0.9 NG/DL (ref 0.9–1.7)
TSH SERPL DL<=0.005 MIU/L-ACNC: 7 UIU/ML (ref 0.3–4.2)

## 2024-04-26 ENCOUNTER — MYC MEDICAL ADVICE (OUTPATIENT)
Dept: OBGYN | Facility: CLINIC | Age: 24
End: 2024-04-26
Payer: COMMERCIAL

## 2024-04-26 DIAGNOSIS — E28.2 PCOS (POLYCYSTIC OVARIAN SYNDROME): Primary | ICD-10-CM

## 2024-04-29 RX ORDER — DESOGESTREL AND ETHINYL ESTRADIOL 0.15-0.03
1 KIT ORAL DAILY
Qty: 84 TABLET | Refills: 1 | Status: SHIPPED | OUTPATIENT
Start: 2024-04-29 | End: 2024-09-23

## 2024-04-29 NOTE — TELEPHONE ENCOUNTER
Pt reports she went off birth control for a few months and things have returned to normal  Was seen in Jan for concerns for PCOS/Menstrual irregularity  Pt requesting Apri script to  [facility]

## 2024-05-03 DIAGNOSIS — E03.9 HYPOTHYROIDISM, UNSPECIFIED TYPE: ICD-10-CM

## 2024-05-03 RX ORDER — LEVOTHYROXINE SODIUM 25 UG/1
25 TABLET ORAL DAILY
Qty: 90 TABLET | Refills: 0 | Status: SHIPPED | OUTPATIENT
Start: 2024-05-03 | End: 2024-05-21

## 2024-05-07 ENCOUNTER — MYC MEDICAL ADVICE (OUTPATIENT)
Dept: FAMILY MEDICINE | Facility: CLINIC | Age: 24
End: 2024-05-07
Payer: COMMERCIAL

## 2024-05-07 NOTE — TELEPHONE ENCOUNTER
Routing to provider - Please see mychart message from pt and advise.     Thank you - Elle Carrasco, JEREMIASN, RN

## 2024-05-09 ENCOUNTER — TELEPHONE (OUTPATIENT)
Dept: FAMILY MEDICINE | Facility: CLINIC | Age: 24
End: 2024-05-09
Payer: COMMERCIAL

## 2024-05-09 NOTE — TELEPHONE ENCOUNTER
Hello,    We received a referral for this patient to schedule an MTM appointment for the [employer] employee weight management program. In order for us to schedule the visit, the patient has to meet either one of the two clinical criteria per insurance guidelines for 2024:    BMI over 40kg at start of medication  2.   BMI over 30kg at start of medication-with diagnosis of non-alcoholic fatty liver    It does not look like the patient qualifies based on the criteria in the chart. We have been instructed to refer patients back to the provider for alternative options if they do not meet criteria as we would be unable to schedule them an MTM visit with weight management. If the patient does meet criteria, we would need that documentation updated in the referral notes.    Thank you,

## 2024-05-17 ENCOUNTER — TELEPHONE (OUTPATIENT)
Dept: FAMILY MEDICINE | Facility: CLINIC | Age: 24
End: 2024-05-17
Payer: COMMERCIAL

## 2024-05-17 NOTE — TELEPHONE ENCOUNTER
MTM referral from: [facility] visit (referral by provider)    MTM referral outreach attempt #2 on May 17, 2024 at 10:31 AM      Outcome: Patient not reachable after several attempts, sent [employer] message    Use [facility] emp for the carrier/Plan on the flowsheet      MyChart Message Sent    See Sumit MEDINA   [phone]

## 2024-05-21 ENCOUNTER — MYC MEDICAL ADVICE (OUTPATIENT)
Dept: FAMILY MEDICINE | Facility: CLINIC | Age: 24
End: 2024-05-21

## 2024-05-21 ENCOUNTER — VIRTUAL VISIT (OUTPATIENT)
Dept: FAMILY MEDICINE | Facility: CLINIC | Age: 24
End: 2024-05-21
Payer: COMMERCIAL

## 2024-05-21 DIAGNOSIS — E03.9 HYPOTHYROIDISM, UNSPECIFIED TYPE: ICD-10-CM

## 2024-05-21 DIAGNOSIS — E66.811 CLASS 1 OBESITY WITHOUT SERIOUS COMORBIDITY IN ADULT, UNSPECIFIED BMI, UNSPECIFIED OBESITY TYPE: ICD-10-CM

## 2024-05-21 DIAGNOSIS — G43.809 OTHER MIGRAINE WITHOUT STATUS MIGRAINOSUS, NOT INTRACTABLE: Primary | ICD-10-CM

## 2024-05-21 DIAGNOSIS — D64.9 ANEMIA, UNSPECIFIED TYPE: ICD-10-CM

## 2024-05-21 PROCEDURE — 99214 OFFICE O/P EST MOD 30 MIN: CPT | Mod: 95 | Performed by: FAMILY MEDICINE

## 2024-05-21 RX ORDER — FERROUS SULFATE 325(65) MG
325 TABLET ORAL
Qty: 90 TABLET | Refills: 3 | Status: SHIPPED | OUTPATIENT
Start: 2024-05-21

## 2024-05-21 RX ORDER — TOPIRAMATE 25 MG/1
TABLET, FILM COATED ORAL
Qty: 60 TABLET | Refills: 2 | Status: SHIPPED | OUTPATIENT
Start: 2024-05-21

## 2024-05-21 RX ORDER — SUMATRIPTAN 25 MG/1
25 TABLET, FILM COATED ORAL
Qty: 9 TABLET | Refills: 4 | Status: SHIPPED | OUTPATIENT
Start: 2024-05-21

## 2024-05-21 RX ORDER — LEVOTHYROXINE SODIUM 25 UG/1
TABLET ORAL
Qty: 108 TABLET | Refills: 1 | Status: SHIPPED | OUTPATIENT
Start: 2024-05-21

## 2024-05-21 NOTE — PROGRESS NOTES
"    Instructions Relayed to Patient by Virtual Roomer:     Patient is active on Zeto:   Relayed following to patient: \"It looks like you are active on Zeto, are you able to join the visit this way? If not, do you need us to send you a link now or would you like your provider to send a link via text or email when they are ready to initiate the visit?\"      Patient Confirmed they will join visit via: [employer]  Reminded patient to ensure they were logged on to virtual visit by arrival time listed.   Asked if patient has flexibility to initiate visit sooner than arrival time: patient stated yes, documented in appointment notes availability to initiate visit earlier than arrival time     If pediatric virtual visit, ensured pediatric patient along with parent/guardian will be present for video visit.     Patient offered the website [email or web address] and/or phone number to Zeto Help line: [phone]      Answers submitted by the patient for this visit:  Hypothyroid  (Submitted on 5/20/2024)  Chief Complaint: Chronic problems general questions HPI Form  Since last visit, patient describes the following symptoms:: None  General Questionnaire (Submitted on 5/20/2024)  Chief Complaint: Chronic problems general questions HPI Form  What is the reason for your visit today? : Recheck medication (Levothyroxine) and migraines  How many servings of fruits and vegetables do you eat daily?: 2-3  On average, how many sweetened beverages do you drink each day (Examples: soda, juice, sweet tea, etc.  Do NOT count diet or artificially sweetened beverages)?: 0  How many minutes a day do you exercise enough to make your heart beat faster?: 20 to 29  How many days a week do you exercise enough to make your heart beat faster?: 4  How many days per week do you miss taking your medication?: 1  What makes it hard for you to take your medication every day?: remembering to take, anil Goetz is a 24 year old who is " being evaluated via a billable video visit.    How would you like to obtain your AVS? MyChart  If the video visit is dropped, the invitation should be resent by: Text to cell phone: [phone]  Will anyone else be joining your video visit? No    ASSESSMENT / PLAN:  (V20.501) Other migraine without status migrainosus, not intractable  (primary encounter diagnosis)  Comment: needs help  Plan: SUMAtriptan (IMITREX) 25 MG tablet        Patient will discuss with her mom which ones helped.     (E66.9) Class 1 obesity without serious comorbidity in adult, unspecified BMI, unspecified obesity type  Comment: needs help  Plan: seeing MTM for weight loss meds.   Consider topamax too. Exercise.     (E03.9) Hypothyroidism, unspecified type  Comment: a little under treatment   Plan: levothyroxine (SYNTHROID/LEVOTHROID) 25 MCG         tablet        Will increase to 2 pills on weekends. Can do just daily during hot summer months. Recheck in 6 months      (D64.9) Anemia, unspecified type  Comment: stable  Plan: ferrous sulfate (FEROSUL) 325 (65 Fe) MG tablet        Expected course and warning signs reviewed. Recheck in 6 months  Sooner if worsening fatigue/etc.       Yazmin Goetz is a 24 year old, presenting for the following health issues:  Follow-up anemia, obesity, hyperglycemia and hypothyroidism.   Patient seen ob/gyn and had tsh/cbc 3months ago.   Too cold and some fatigue.worsening migraines -recently.   Can feel it coming on.  MTM - set-up for for weight loss.   Emotionally doing ok. Work doing ok.   No currently dating.   No chief complaint on file.      Via the Health Maintenance questionnaire, the patient has reported the following services have been completed -Eye Exam: [facility] 2023-08-06, this information has been sent to the abstraction team.  History of Present Illness       Hypothyroidism:     Since last visit, patient describes the following symptoms::  None    Reason for visit:   Recheck medication (Levothyroxine) and migraines    She eats 2-3 servings of fruits and vegetables daily.She consumes 0 sweetened beverage(s) daily.She exercises with enough effort to increase her heart rate 20 to 29 minutes per day.  She exercises with enough effort to increase her heart rate 4 days per week. She is missing 1 dose(s) of medications per week.  She is not taking prescribed medications regularly due to remembering to take and other.                   Objective           Vitals:  No vitals were obtained today due to virtual visit.    Physical Exam   GENERAL: alert and no distress  EYES: Eyes grossly normal to inspection.  No discharge or erythema, or obvious scleral/conjunctival abnormalities.  RESP: No audible wheeze, cough, or visible cyanosis.    SKIN: Visible skin clear. No significant rash, abnormal pigmentation or lesions.  NEURO: Cranial nerves grossly intact.  Mentation and speech appropriate for age.  PSYCH: Appropriate affect, tone, and pace of words          Video-Visit Details    Type of service:  Video Visit   Originating Location (pt. Location): Home    Distant Location (provider location):  On-site  Platform used for Video Visit: Amy  Signed Electronically by: Tom Quarles MD

## 2024-06-06 ENCOUNTER — OFFICE VISIT (OUTPATIENT)
Dept: URGENT CARE | Facility: URGENT CARE | Age: 24
End: 2024-06-06
Payer: COMMERCIAL

## 2024-06-06 ENCOUNTER — NURSE TRIAGE (OUTPATIENT)
Dept: FAMILY MEDICINE | Facility: CLINIC | Age: 24
End: 2024-06-06
Payer: COMMERCIAL

## 2024-06-06 VITALS
HEART RATE: 83 BPM | TEMPERATURE: 98.8 F | OXYGEN SATURATION: 100 % | SYSTOLIC BLOOD PRESSURE: 127 MMHG | RESPIRATION RATE: 17 BRPM | DIASTOLIC BLOOD PRESSURE: 83 MMHG

## 2024-06-06 DIAGNOSIS — H00.011 HORDEOLUM EXTERNUM OF RIGHT UPPER EYELID: Primary | ICD-10-CM

## 2024-06-06 PROCEDURE — 99213 OFFICE O/P EST LOW 20 MIN: CPT | Performed by: STUDENT IN AN ORGANIZED HEALTH CARE EDUCATION/TRAINING PROGRAM

## 2024-06-06 RX ORDER — ERYTHROMYCIN 5 MG/G
0.5 OINTMENT OPHTHALMIC AT BEDTIME
Qty: 1 G | Refills: 0 | Status: SHIPPED | OUTPATIENT
Start: 2024-06-06 | End: 2024-06-13

## 2024-06-06 NOTE — TELEPHONE ENCOUNTER
This encounter is being created due to the [employer] message dated 6/6/2024 as written below:       Patient reports that she believes that she may have a sty in her right eye. It started to get swollen last night and got worse today. If she looks under her right upper eye lid there is an area that is white and looks like a pimple. It is a little painful.    Denies: Fever, cough, rash     Nursing advice: Patient is to be assessed in clinic today. There are no appointments available in primary care [place] today. Patient will go to urgent care today.  Patient verbalized good understanding, agrees with plan and needs no further support.  Thank you. Kimberly Carlton R.N.     Reason for Disposition   Eyelid is very swollen and no fever    Additional Information   Negative: Patient sounds very sick or weak to the triager   Negative: Eyelid is red and fever   Negative: Eyelid is swollen and fever   Negative: Redness spreads around the eye (both upper and lower eyelid are red)   Negative: Blurred vision and new or worsening    Protocols used: Sty-A-OH

## 2024-06-06 NOTE — PROGRESS NOTES
Assessment & Plan     Hordeolum externum of right upper eyelid  Advised warm packs and erythromycin ointment to lash line at bedtime. Avoid eye cosmetics until healed. Follow up if symptoms persist or worsen.    - erythromycin (ROMYCIN) 5 MG/GM ophthalmic ointment  Dispense: 1 g; Refill: 0         No follow-ups on file.    BEN Kruse [facility]    Yazmin Goetz is a 24 year old female who presents to clinic today for the following health issues:  Chief Complaint   Patient presents with    Eye Problem     Possible stype of upper right eyelid. Some swelling/inflammation and mild pain. Red pimple like spot with a little bit of white. Appeared last night.      HPI        Review of Systems  Constitutional, HEENT, cardiovascular, pulmonary, gi and gu systems are negative, except as otherwise noted.      Objective    /83 (BP Location: Left arm, Cuff Size: Adult Large)   Pulse 83   Temp 98.8  F (37.1  C) (Tympanic)   Resp 17   SpO2 100%   Physical Exam   GENERAL: alert and no distress  EYES: Eyes grossly normal to inspection, EOMI, and white pustule on medial right upper lash line  MS: no gross musculoskeletal defects noted, no edema  SKIN: no suspicious lesions or rashes  NEURO: Normal strength and tone, mentation intact and speech normal

## 2024-07-01 ENCOUNTER — TRANSFERRED RECORDS (OUTPATIENT)
Dept: MULTI SPECIALTY CLINIC | Facility: CLINIC | Age: 24
End: 2024-07-01

## 2024-07-01 LAB — RETINOPATHY: NORMAL

## 2024-07-10 ENCOUNTER — VIRTUAL VISIT (OUTPATIENT)
Dept: PHARMACY | Facility: CLINIC | Age: 24
End: 2024-07-10
Payer: COMMERCIAL

## 2024-07-10 ENCOUNTER — OFFICE VISIT (OUTPATIENT)
Dept: URGENT CARE | Facility: URGENT CARE | Age: 24
End: 2024-07-10
Payer: COMMERCIAL

## 2024-07-10 ENCOUNTER — ANCILLARY PROCEDURE (OUTPATIENT)
Dept: GENERAL RADIOLOGY | Facility: CLINIC | Age: 24
End: 2024-07-10
Attending: STUDENT IN AN ORGANIZED HEALTH CARE EDUCATION/TRAINING PROGRAM
Payer: COMMERCIAL

## 2024-07-10 VITALS
OXYGEN SATURATION: 97 % | DIASTOLIC BLOOD PRESSURE: 98 MMHG | SYSTOLIC BLOOD PRESSURE: 144 MMHG | WEIGHT: 195 LBS | TEMPERATURE: 98.4 F | RESPIRATION RATE: 18 BRPM | HEART RATE: 86 BPM | BODY MASS INDEX: 36.84 KG/M2

## 2024-07-10 DIAGNOSIS — E03.9 HYPOTHYROIDISM: ICD-10-CM

## 2024-07-10 DIAGNOSIS — R05.1 ACUTE COUGH: ICD-10-CM

## 2024-07-10 DIAGNOSIS — U07.1 INFECTION DUE TO 2019 NOVEL CORONAVIRUS: Primary | ICD-10-CM

## 2024-07-10 DIAGNOSIS — Z78.9 TAKES DIETARY SUPPLEMENTS: ICD-10-CM

## 2024-07-10 DIAGNOSIS — E66.9 OBESITY: Primary | ICD-10-CM

## 2024-07-10 PROCEDURE — 99605 MTMS BY PHARM NP 15 MIN: CPT | Performed by: PHARMACIST

## 2024-07-10 PROCEDURE — 99607 MTMS BY PHARM ADDL 15 MIN: CPT | Performed by: PHARMACIST

## 2024-07-10 PROCEDURE — 71046 X-RAY EXAM CHEST 2 VIEWS: CPT | Mod: TC | Performed by: INTERNAL MEDICINE

## 2024-07-10 PROCEDURE — 99213 OFFICE O/P EST LOW 20 MIN: CPT | Performed by: STUDENT IN AN ORGANIZED HEALTH CARE EDUCATION/TRAINING PROGRAM

## 2024-07-10 RX ORDER — PREDNISONE 20 MG/1
40 TABLET ORAL DAILY
Qty: 10 TABLET | Refills: 0 | Status: SHIPPED | OUTPATIENT
Start: 2024-07-10 | End: 2024-07-15

## 2024-07-10 RX ORDER — BENZONATATE 100 MG/1
100-200 CAPSULE ORAL 3 TIMES DAILY PRN
Qty: 30 CAPSULE | Refills: 0 | Status: SHIPPED | OUTPATIENT
Start: 2024-07-10

## 2024-07-10 NOTE — PROGRESS NOTES
Assessment & Plan     Infection due to 2019 novel coronavirus  Positive for Covid with home test 2 weeks ago. Cough has been persistent. CXR negative for bacterial infiltrate of the lungs. Advised treating inflammation in the airways with prednisone x 5 days, benzonatate as needed to suppress cough and rest. Follow up if symptoms persist or worsen.      Acute cough  - XR Chest 2 Views  - predniSONE (DELTASONE) 20 MG tablet  Dispense: 10 tablet; Refill: 0  - benzonatate (TESSALON) 100 MG capsule  Dispense: 30 capsule; Refill: 0         No follow-ups on file.    Andria Abdullahi, BEN CNP  [facility]    Yazmin Goetz is a 24 year old female who presents to clinic today for the following health issues:  Chief Complaint   Patient presents with    Urgent Care    Cough     Per patient states 12 days ago she was positive for Covid after returning from a cruise since then has been having cough, fatigued and fogginess. Mother is concerned of pneumonia or bronchitis          7/10/2024     4:03 PM   Additional Questions   Roomed by KULDIP Rios   Accompanied by Self     HPI      Review of Systems  Constitutional, HEENT, cardiovascular, pulmonary, GI, , musculoskeletal, neuro, skin, endocrine and psych systems are negative, except as otherwise noted.      Objective    BP (!) 144/98   Pulse 86   Temp 98.4  F (36.9  C) (Tympanic)   Resp 18   Wt 88.5 kg (195 lb)   SpO2 97%   BMI 36.84 kg/m    Physical Exam   GENERAL: alert and no distress  EYES: Eyes grossly normal to inspection, PERRL and conjunctivae and sclerae normal  HENT: ear canals and TM's normal, nose and mouth without ulcers or lesions  RESP: lungs clear to auscultation - no rales, rhonchi or wheezes  CV: regular rate and rhythm, normal S1 S2, no S3 or S4, no murmur, click or rub, no peripheral edema  MS: no gross musculoskeletal defects noted, no edema  SKIN: no suspicious lesions or rashes  NEURO: Normal strength and tone,  mentation intact and speech normal  PSYCH: mentation appears normal, affect normal/bright    No results found for this or any previous visit (from the past 24 hour(s)).

## 2024-07-10 NOTE — PATIENT INSTRUCTIONS
"Recommendations from today's MTM visit:                                                    MTM (medication therapy management) is a service provided by a clinical pharmacist designed to help you get the most of out of your medicines.   Today we reviewed what your medicines are for, how to know if they are working, that your medicines are safe and how to make your medicine regimen as easy as possible.      Recheck thyroid levels - schedule lab only appointment.  Keep levothyroxine in the morning, move the rest of you pills to at bedtime.  Margot will check with Dr. Quarles on compounded semaglutide from [facility].    Follow-up: Return in about 5 weeks (around 8/14/2024) for Medication Therapy Management.    It was great speaking with you today.  I value your experience and would be very thankful for your time in providing feedback in our clinic survey. In the next few days, you may receive an email or text message from [employer] with a link to a survey related to your  clinical pharmacist.\"     To schedule another MTM appointment, please call the clinic directly or you may call the MTM scheduling line at [phone] or toll-free at [phone].     My Clinical Pharmacist's contact information:                                                      Please feel free to contact me with any questions or concerns you have.      Margot Newton, PharmD  Medication Therapy Management Pharmacist  [phone]    "

## 2024-07-10 NOTE — PROGRESS NOTES
Medication Therapy Management (MTM) Encounter    ASSESSMENT:                            Medication Adherence/Access: can simplify things and reduce to taking medications twice daily     Weight Management: Doesn't qualify for [facility] weight loss injectable option, as BMI is not > 40.  Education provided on topiramate - she will start this, plans to start with just 25 mg at bedtime for now, possibly will increase if tolerating well. May also benefit from GLP-1, discussed compounded options of semaglutide and tirzepatide through [facility] (would not recommend from anywhere else) - she could take this for 1-3 months and then remain on topiramate/titrate up on topiramate.    Hypothyroidism: TSH is above goal - may benefit from recheck. Multivitamin can interact with levothyroxine, may benefit from moving to bedtime.    Supplements: multivitamin can interact with levothyroxine, as above.    Allergic Rhinitis: Stable.    Migraine: Education provided on topiramate, see above.     PLAN:                            Recheck thyroid levels - schedule lab only appointment.  Keep levothyroxine in the morning, move the rest of you pills to at bedtime.  Margot will check with Dr. Quarles on compounded semaglutide from [facility].    Suggestions/questions for Dr. Quarles:  Hi Dr. Quarles, I met with Sofy today for MTM - she unfortunately doesn't qualify for a GLP-1 agoinst through insurance (BMI is < 40).  She is interested in the compounded option of semaglutide, however, which can be done through the [facility]. She'd like to start out on this for up to three months to help get the weight loss going, then plans to remain on topiramate to follow.  I wanted to check with you and see if that would be alright to do. I'm happy to order this for the [facility] pharmacy if you would like, she would start at semaglutide 0.25 mg weekly and likely continue that same dose for up to three  months.     Thanks,  Margot Newton, PharmD  Medication Therapy Management Pharmacist    Follow-up: Return in about 5 weeks (around 8/14/2024) for Medication Therapy Management.    SUBJECTIVE/OBJECTIVE:                          Sofy Stringer is a 24 year old female seen for an initial visit. She was referred to me from Tom Quarles for Weight Mgmt [employer].      Reason for visit: med review.    Allergies/ADRs: Reviewed in chart  Past Medical History: Reviewed in chart  Tobacco: She reports that she has never smoked. She has never used smokeless tobacco.  Alcohol: once every six months    Medication Adherence/Access:   Patient uses pill box(es).  Patient takes medications 3 time(s) per day.   Misses levothyroxine on the weekends when doesn't wake up at her normal time.  The patient fills medications at [facility]: NO, fills medications at [facility].    Weight Management   None - recently was started on topiramate for migraines (see below)    She'd like to try an injectable medication, low dose, if even just for a few months to get her jump started, then switch to an oral pill.   Her mom takes semaglutide and it seems to be helping her.     Nutrition/Eating Habits:   Low salt, low fat or no fat, no grease  Fruits or vegetables with every meal  She likes bananas (known as the Monkey in her house).  Blueberries, grapes, strawberries.   Green leafy vegetables, carrots, potatoes, corn, lettuce  Breakfast, 5:30 am: cereal, Cheerios and a banana  Snack: blueberries  Lunch (when at work, skips when at home sometimes): left overs or Smart[employer] TV dinner, sometimes also a banana  Snack: banana sometimes  Supper, 6:30 pm: varies, stir luther, tuna hot dish, spaghetti   Rarely has ice cream after supper.  Medication History:  Saxenda: 3 mg - not effective for weight loss    BMI: 35.31 as of 11/1/23.  Starting Weight: 198 lb     Current weight: 198 lb  Difference: n/a  Wt Readings from Last 5 Encounters:   02/06/24  191 lb 6 oz (86.8 kg)   01/11/24 188 lb (85.3 kg)   11/01/23 187 lb 6.4 oz (85 kg)   06/27/23 183 lb (83 kg)   04/17/23 186 lb 6.4 oz (84.6 kg)     Hypothyroidism:   Levothyroxine 25 mcg daily at 4:30 am - has the option to take 50 mcg on the weekends during the summer, Dr. Quarles said some people have trouble regulating temperature in the summer but she hasn't - also misses levothyroxine on weekends often because she doesn't wake up as early as during the week.    Patient is having the following symptoms: hypothyroidism -  weight gain.     TSH   Date Value Ref Range Status   02/06/2024 7.00 (H) 0.30 - 4.20 uIU/mL Final   12/09/2022 7.32 (H) 0.40 - 4.00 mU/L Final   06/18/2021 2.17 0.40 - 4.00 mU/L Final     Supplements:   Ferrous sulfate 325 mg daily - lunch   Vitamin C 1000 mg daily   Multivitamin daily - at 4:30 am    No reported issues at this time.     Allergic Rhinitis:   cetirizine 10 mg once daily    Patient feels that current therapy is effective.     Migraine:   Preventive medications  Topiramate 25 mg once daily at bedtime - it's newer, only taking it half the week, has only taken one dose so far   Acute medications  Sumatriptan 25 mg as needed    Had been having migraines daily for a while, her mom gets these too, hers are more debilitating.  She just came back from vacation and migraines have been better.     Today's Vitals: There were no vitals taken for this visit.  ----------------      I spent 48 minutes with this patient today. All changes were made via collaborative practice agreement with Tom Quarles MD. A copy of the visit note was provided to the patient's provider(s).    A summary of these recommendations was sent via [employer].    Margot Newton PharmD  Medication Therapy Management Pharmacist      Telemedicine Visit Details  Type of service:  Telephone visit  Start Time: 2:01 PM  End Time: 2:49 PM     Medication Therapy Recommendations  Obesity    Current Medication: topiramate (TOPAMAX) 25  MG tablet   Rationale: Does not understand instructions - Adherence - Adherence   Recommendation: Provide Education   Status: Patient Agreed - Adherence/Education          Current Medication: topiramate (TOPAMAX) 25 MG tablet   Rationale: Synergistic therapy - Needs additional medication therapy - Indication   Recommendation: Start Medication - Semaglutide-Weight Management 0.25 MG/0.5ML pen   Status: Contact Provider - Awaiting Response

## 2024-07-10 NOTE — Clinical Note
Hi Dr. Quarles, I met with Sofy today for MTM - she unfortunately doesn't qualify for a GLP-1 agoinst through insurance (BMI is < 40).  She is interested in the compounded option of semaglutide, however, which can be done through the [facility]. She'd like to start out on this for up to three months to help get the weight loss going, then plans to remain on topiramate to follow.  I wanted to check with you and see if that would be alright to do. I'm happy to order this for the [facility] pharmacy if you would like.   Thanks, Margot Newton, PharmD Medication Therapy Management Pharmacist

## 2024-07-16 ENCOUNTER — MYC REFILL (OUTPATIENT)
Dept: OBGYN | Facility: CLINIC | Age: 24
End: 2024-07-16
Payer: COMMERCIAL

## 2024-07-16 DIAGNOSIS — E28.2 PCOS (POLYCYSTIC OVARIAN SYNDROME): ICD-10-CM

## 2024-07-17 RX ORDER — DESOGESTREL AND ETHINYL ESTRADIOL 0.15-0.03
1 KIT ORAL DAILY
Qty: 84 TABLET | Refills: 1 | OUTPATIENT
Start: 2024-07-17

## 2024-07-17 NOTE — TELEPHONE ENCOUNTER
Requested Prescriptions   Pending Prescriptions Disp Refills    desogestrel-ethinyl estradiol (APRI) 0.15-30 MG-MCG tablet 84 tablet 1     Sig: Take 1 tablet by mouth daily       Contraceptives Protocol Passed - 7/16/2024  1:59 PM        Passed - Patient is not a current smoker if age is 35 or older        Passed - Recent (12 mo) or future (30 days) visit within the authorizing provider's specialty     The patient must have completed an in-person or virtual visit within the past 12 months or has a future visit scheduled within the next 90 days with the authorizing provider s specialty.  Urgent care and e-visits do not quality as an office visit for this protocol.          Passed - Medication is active on med list        Passed - Medication indicated for associated diagnosis     Medication is associated with one or more of the following diagnoses:  Contraception  Acne  Dysmenorrhea  Menorrhagia  Amenorrhea  PCOS  Premenstrual Dysphoric Disorder          Passed - No active pregnancy on record        Passed - No positive pregnancy test in past 12 months           Last Written Prescription Date:  4/29/24  Last Fill Quantity: 84,  # refills: 1   Last office visit: 1/11/2024 ; last virtual visit: Visit date not found with prescribing provider:  Dr. Reeves   Future Office Visit:   Next 5 appointments (look out 90 days)      Aug 14, 2024 3:30 PM  Pharmacist Visit with Ana Newton RPH  [facility] ([address] [address] [phone]             Refill request refused due to:  [x]Refills available at pharmacy. Request sent back to pharmacy as duplicate.  []Patient reports no longer needing medication.  []Medication discontinued.   Susana Graham RN on 7/17/2024 at 12:27 PM

## 2024-08-14 ENCOUNTER — VIRTUAL VISIT (OUTPATIENT)
Dept: PHARMACY | Facility: CLINIC | Age: 24
End: 2024-08-14
Payer: COMMERCIAL

## 2024-08-14 DIAGNOSIS — E03.9 HYPOTHYROIDISM: ICD-10-CM

## 2024-08-14 DIAGNOSIS — E66.9 OBESITY: Primary | ICD-10-CM

## 2024-08-14 PROCEDURE — 99606 MTMS BY PHARM EST 15 MIN: CPT | Mod: 93 | Performed by: PHARMACIST

## 2024-08-14 PROCEDURE — 99607 MTMS BY PHARM ADDL 15 MIN: CPT | Performed by: PHARMACIST

## 2024-08-14 NOTE — PATIENT INSTRUCTIONS
"Recommendations from today's MTM visit:                                                         Increase semaglutide to 0.5 mg weekly.   Recheck thyroid levels - schedule lab only appointment.    Follow-up: Return in about 4 weeks (around 9/11/2024) for Medication Therapy Management.    It was great speaking with you today.  I value your experience and would be very thankful for your time in providing feedback in our clinic survey. In the next few days, you may receive an email or text message from [email or web address] with a link to a survey related to your  clinical pharmacist.\"     To schedule another MTM appointment, please call the clinic directly or you may call the MTM scheduling line at [phone] or toll-free at [phone].     My Clinical Pharmacist's contact information:                                                      Please feel free to contact me with any questions or concerns you have.      Margot Newton, PharmD  Medication Therapy Management Pharmacist   "

## 2024-08-14 NOTE — Clinical Note
CAROL FARAH note, thanks!  Margot Newton, PharmD Medication Therapy Management Pharmacist [phone]

## 2024-08-14 NOTE — PROGRESS NOTES
Medication Therapy Management (MTM) Encounter    ASSESSMENT:                            Medication Adherence/Access: No issues identified    Weight Management: Doesn't qualify for FV weight loss injectable option, as BMI is not > 40.  May benefit from increasing semaglutide through [facility] - she could take this for 1-3 months and then remain on topiramate/titrate up on topiramate.    Hypothyroidism: TSH is above goal - may benefit from recheck - she plans to schedule in the next couple of weeks.    Migraine: Sofy will discuss with Dr. Quarles, could consider increasing topiramate.    PLAN:                            Increase semaglutide to 0.5 mg weekly.   Recheck thyroid levels - schedule lab only appointment.    Follow-up: Return in about 4 weeks (around 9/11/2024) for Medication Therapy Management.    SUBJECTIVE/OBJECTIVE:                          Sofy Stringer is a 24 year old female seen for a follow-up visit.     Reason for visit: med review.    Allergies/ADRs: Reviewed in chart  Past Medical History: Reviewed in chart  Tobacco: She reports that she has never smoked. She has never used smokeless tobacco.  Alcohol: once every six months    Medication Adherence/Access:   Patient uses pill box(es).  Patient takes medications 3 time(s) per day.   Misses levothyroxine on the weekends when doesn't wake up at her normal time.  The patient fills medications at [place]: NO, fills medications at [facility].    Weight Management   Semaglutide 0.25 mg weekly   Topiramate 50 mg at bedtime (started for migraines)    Not feeling any different, still eating and craving food just as much, interested in dose increase of semaglutide.  She'd like to utilize the injectable semaglutide if even just for a few months to get her jump started, then switch to an oral pill.   Her mom takes semaglutide and it seems to be helping her.   Denies side effects.    Medication History:  Saxenda: 3 mg - not  effective for weight loss    BMI: 35.31 as of 11/1/23.  Starting Weight: 198 lb     Current weight: 193 lb (198 lb 1 month ago)  Difference: n/a  Wt Readings from Last 5 Encounters:   07/10/24 195 lb (88.5 kg)   02/06/24 191 lb 6 oz (86.8 kg)   01/11/24 188 lb (85.3 kg)   11/01/23 187 lb 6.4 oz (85 kg)   06/27/23 183 lb (83 kg)     Hypothyroidism:   Levothyroxine 25 mcg daily at 4:30 am - has the option to take 50 mcg on the weekends during the summer, Dr. Quarles said some people have trouble regulating temperature in the summer but she hasn't - also misses levothyroxine on weekends often because she doesn't wake up as early as during the week.    Patient is having the following symptoms: hypothyroidism -  weight gain.     TSH   Date Value Ref Range Status   02/06/2024 7.00 (H) 0.30 - 4.20 uIU/mL Final   12/09/2022 7.32 (H) 0.40 - 4.00 mU/L Final   06/18/2021 2.17 0.40 - 4.00 mU/L Final     Migraine:   Preventive medications  Topiramate 50 mg once daily at bedtime   Acute medications  Sumatriptan 25 mg as needed  Excedrin or ibuprofen as needed    If she takes the topiramate 25 mg twice daily, it caused daytime sedation. Still getting migraines.  Had been having migraines daily for a while, her mom gets these too, hers are more debilitating.      Today's Vitals: There were no vitals taken for this visit.  ----------------      I spent 10 minutes with this patient today. All changes were made via collaborative practice agreement with Tom Quarles MD. A copy of the visit note was provided to the patient's provider(s).    A summary of these recommendations was sent via [employer].    Margot Newton, Edwardo  Medication Therapy Management Pharmacist    Telemedicine Visit Details  Type of service:  Telephone visit  Start Time: 3:31 PM  End Time: 3:41 PM     Medication Therapy Recommendations  Obesity    Current Medication: COMPOUNDED NON-CONTROLLED SUBSTANCE (CMPD RX) - PHARMACY TO MIX COMPOUNDED MEDICATION   Rationale: Dose  too low - Dosage too low - Effectiveness   Recommendation: Increase Dose - COMPOUNDED NON-CONTROLLED SUBSTANCE - PHARMACY TO MIX COMPOUNDED MEDICATION   Status: Accepted per CPA          Current Medication: topiramate (TOPAMAX) 25 MG tablet   Rationale: Synergistic therapy - Needs additional medication therapy - Indication   Recommendation: Start Medication - Semaglutide-Weight Management 0.25 MG/0.5ML pen   Status: Accepted per Provider

## 2024-09-08 ENCOUNTER — MYC MEDICAL ADVICE (OUTPATIENT)
Dept: PHARMACY | Facility: CLINIC | Age: 24
End: 2024-09-08
Payer: COMMERCIAL

## 2024-09-08 DIAGNOSIS — E66.9 OBESITY: ICD-10-CM

## 2024-09-08 DIAGNOSIS — E66.9 OBESITY WITH SERIOUS COMORBIDITY, UNSPECIFIED CLASSIFICATION, UNSPECIFIED OBESITY TYPE: Primary | ICD-10-CM

## 2024-09-17 ENCOUNTER — LAB (OUTPATIENT)
Dept: LAB | Facility: CLINIC | Age: 24
End: 2024-09-17
Payer: COMMERCIAL

## 2024-09-17 DIAGNOSIS — E03.9 HYPOTHYROIDISM: ICD-10-CM

## 2024-09-17 DIAGNOSIS — N92.1 MENORRHAGIA WITH IRREGULAR CYCLE: ICD-10-CM

## 2024-09-17 PROCEDURE — 83550 IRON BINDING TEST: CPT

## 2024-09-17 PROCEDURE — 84443 ASSAY THYROID STIM HORMONE: CPT

## 2024-09-17 PROCEDURE — 83540 ASSAY OF IRON: CPT

## 2024-09-17 PROCEDURE — 36415 COLL VENOUS BLD VENIPUNCTURE: CPT

## 2024-09-18 LAB
IRON BINDING CAPACITY (ROCHE): 355 UG/DL (ref 240–430)
IRON SATN MFR SERPL: 25 % (ref 15–46)
IRON SERPL-MCNC: 88 UG/DL (ref 37–145)
TSH SERPL DL<=0.005 MIU/L-ACNC: 3.92 UIU/ML (ref 0.3–4.2)

## 2024-09-23 DIAGNOSIS — E28.2 PCOS (POLYCYSTIC OVARIAN SYNDROME): ICD-10-CM

## 2024-09-24 RX ORDER — DESOGESTREL AND ETHINYL ESTRADIOL 0.15-0.03
1 KIT ORAL DAILY
Qty: 84 TABLET | Refills: 1 | Status: SHIPPED | OUTPATIENT
Start: 2024-09-24

## 2024-09-25 ENCOUNTER — VIRTUAL VISIT (OUTPATIENT)
Dept: PHARMACY | Facility: CLINIC | Age: 24
End: 2024-09-25
Payer: COMMERCIAL

## 2024-09-25 DIAGNOSIS — G43.909 MIGRAINE: ICD-10-CM

## 2024-09-25 DIAGNOSIS — E66.9 OBESITY WITH SERIOUS COMORBIDITY, UNSPECIFIED CLASSIFICATION, UNSPECIFIED OBESITY TYPE: Primary | ICD-10-CM

## 2024-09-25 DIAGNOSIS — E03.9 HYPOTHYROIDISM: ICD-10-CM

## 2024-09-25 PROCEDURE — 99606 MTMS BY PHARM EST 15 MIN: CPT | Mod: 93 | Performed by: PHARMACIST

## 2024-09-25 PROCEDURE — 99607 MTMS BY PHARM ADDL 15 MIN: CPT | Performed by: PHARMACIST

## 2024-09-25 NOTE — Clinical Note
CAROL FARAH note, thanks!  Margot Newton, PharmD Medication Therapy Management Pharmacist [phone]

## 2024-09-25 NOTE — PROGRESS NOTES
Medication Therapy Management (MTM) Encounter    ASSESSMENT:                            Medication Adherence/Access: No issues identified    Weight Management: Doesn't qualify for FV weight loss injectable option, as BMI is not > 40.  May benefit from increasing semaglutide through [facility].     Hypothyroidism: TSH is within normal limits. Discussed ok to take levothyroxine on weekends later in the morning, just to have same routine of taking 1 hour before breakfast. Alternatively could move levothyroxine to bedtime, 2 hours after food. She'll think about this.    Migraine: Improving. Could consider increasing topiramate dose, maximum of 200 mg at bedtime. Will consider in future.      PLAN:                            Increase semaglutide to 2 mg weekly (draw up to the 40 sonya on the syringe), do this at least four weeks before we consider increasing the dose.           Follow-up: Return in about 3 weeks (around 10/16/2024) for Medication Therapy Management.    SUBJECTIVE/OBJECTIVE:                          Sofy Stringer is a 24 year old female seen for a follow-up visit.       Reason for visit: med review.    Allergies/ADRs: Reviewed in chart  Past Medical History: Reviewed in chart  Tobacco: She reports that she has never smoked. She has never used smokeless tobacco.  Alcohol: once every six months    Medication Adherence/Access:   Patient uses pill box(es).  Patient takes medications 3 time(s) per day.   Misses levothyroxine on the weekends when doesn't wake up at her normal time.  The patient fills medications at [place]: NO, fills medications at [facility].    Weight Management   Semaglutide 1 mg weekly x 4 weeks - getting from [facility], drawing up to 20 sonya on syringe.  Topiramate 50 mg at bedtime (started for migraines)    Does have bouts of nausea but it's very minimal, she's still been eating well.  She's interested in increasing the dose of  semaglutide - initially she didn't want to be on high doses of it, but after her tolerating it well and not seeing much benefit yet she'd like to increase the dose.  Her mom takes semaglutide and it seems to be helping her.   Denies side effects.    Medication History:  Saxenda: 3 mg - not effective for weight loss    BMI: 35.31 as of 11/1/23.  Starting Weight: 198 lb  Current weight: 186.5 lb    Wt Readings from Last 5 Encounters:   07/10/24 195 lb (88.5 kg)   02/06/24 191 lb 6 oz (86.8 kg)   01/11/24 188 lb (85.3 kg)   11/01/23 187 lb 6.4 oz (85 kg)   06/27/23 183 lb (83 kg)     Hypothyroidism:   Levothyroxine 25 mcg daily at 4:30 am, 1 hour to 1.5 hours before eating breakfast - has the option to take 50 mcg on the weekends, she notes Dr. Quarles has given her this option if her temperature regulation seems off - she hasn't needed to take two ever.    It's harder for her to remember the levothyroxine on the weekends, likes to sleep into 10/10:30 am, often forgets taking it then. Her mom's been helping her out with this though and waking her up and giving her pill on weekends early.   Patient is having the following symptoms: hypothyroidism -  none  TSH   Date Value Ref Range Status   09/17/2024 3.92 0.30 - 4.20 uIU/mL Final   12/09/2022 7.32 (H) 0.40 - 4.00 mU/L Final   06/18/2021 2.17 0.40 - 4.00 mU/L Final     Migraine:   Preventive medications  Topiramate 50 mg once daily at bedtime   Acute medications  Sumatriptan 25 mg as needed  Excedrin or ibuprofen as needed - hasn't needed this lately    If she takes the topiramate 25 mg twice daily, it caused daytime sedation, she's comfortable at this dose of topiramate. Still getting migraines, they are better when she's at home, but the stress at work still gives her migraines.   Her mom also gets migraines.    Today's Vitals: There were no vitals taken for this visit.  ----------------      I spent 18 minutes with this patient today. All changes were made via  collaborative practice agreement with Tom Quarles MD. A copy of the visit note was provided to the patient's provider(s).    A summary of these recommendations was sent via [employer].    Margot Newton, RinaD  Medication Therapy Management Pharmacist    Telemedicine Visit Details  The patient's medications can be safely assessed via a telemedicine encounter.  Type of service:  Telephone visit  Originating Location (pt. Location): Home    Distant Location (provider location):  Off-site  Start Time: 3:32 PM  End Time: 3:50 PM     Medication Therapy Recommendations  Obesity    Current Medication: COMPOUNDED NON-CONTROLLED SUBSTANCE (CMPD RX) - PHARMACY TO MIX COMPOUNDED MEDICATION   Rationale: Dose too low - Dosage too low - Effectiveness   Recommendation: Increase Dose   Status: Accepted per CPA

## 2024-09-25 NOTE — PATIENT INSTRUCTIONS
"Recommendations from today's MTM visit:                                                         Increase semaglutide to 2 mg weekly (draw up to the 40 sonya on the syringe), do this at least four weeks before we consider increasing the dose.     Follow-up: Return in about 3 weeks (around 10/16/2024) for Medication Therapy Management.    It was great speaking with you today.  I value your experience and would be very thankful for your time in providing feedback in our clinic survey. In the next few days, you may receive an email or text message from [email or web address] with a link to a survey related to your  clinical pharmacist.\"     To schedule another MTM appointment, please call the clinic directly or you may call the MTM scheduling line at [phone] or toll-free at [phone].     My Clinical Pharmacist's contact information:                                                      Please feel free to contact me with any questions or concerns you have.      Margot Newton, PharmD  Medication Therapy Management Pharmacist     "

## 2024-10-02 ENCOUNTER — PATIENT OUTREACH (OUTPATIENT)
Dept: CARE COORDINATION | Facility: CLINIC | Age: 24
End: 2024-10-02
Payer: COMMERCIAL

## 2024-10-16 ENCOUNTER — PATIENT OUTREACH (OUTPATIENT)
Dept: CARE COORDINATION | Facility: CLINIC | Age: 24
End: 2024-10-16
Payer: COMMERCIAL

## 2024-10-16 ENCOUNTER — VIRTUAL VISIT (OUTPATIENT)
Dept: PHARMACY | Facility: CLINIC | Age: 24
End: 2024-10-16
Payer: COMMERCIAL

## 2024-10-16 DIAGNOSIS — E03.9 HYPOTHYROIDISM: ICD-10-CM

## 2024-10-16 DIAGNOSIS — G43.909 MIGRAINE: ICD-10-CM

## 2024-10-16 DIAGNOSIS — E66.9 OBESITY: Primary | ICD-10-CM

## 2024-10-16 PROCEDURE — 99607 MTMS BY PHARM ADDL 15 MIN: CPT | Mod: 93 | Performed by: PHARMACIST

## 2024-10-16 PROCEDURE — 99606 MTMS BY PHARM EST 15 MIN: CPT | Mod: 93 | Performed by: PHARMACIST

## 2024-10-16 NOTE — PATIENT INSTRUCTIONS
"Recommendations from today's MTM visit:                                                         Increase semaglutide to 2.5 mg weekly (draw up to the 50 sonya on the syringe), this is the maximum dose.      Follow-up: Return in about 4 weeks (around 11/13/2024) for Medication Therapy Management.    It was great speaking with you today.  I value your experience and would be very thankful for your time in providing feedback in our clinic survey. In the next few days, you may receive an email or text message from [employer] with a link to a survey related to your  clinical pharmacist.\"     To schedule another MTM appointment, please call the clinic directly or you may call the MTM scheduling line at [phone] or toll-free at [phone].     My Clinical Pharmacist's contact information:                                                      Please feel free to contact me with any questions or concerns you have.      Margot Newton, PharmD  Medication Therapy Management Pharmacist     "

## 2024-10-16 NOTE — PROGRESS NOTES
Medication Therapy Management (MTM) Encounter    ASSESSMENT:                            Medication Adherence/Access: No issues identified.    Weight Management: Doesn't qualify for FV weight loss injectable option, as BMI is not > 40.  May benefit from increasing semaglutide through [facility].     Hypothyroidism: TSH is within normal limits.     Migraine: Improving. Could consider increasing topiramate dose, maximum of 200 mg at bedtime. Will consider in future.    PLAN:                            Increase semaglutide to 2.5 mg weekly (draw up to the 50 sonya on the syringe), this is the maximum dose.      Follow-up: Return in about 4 weeks (around 11/13/2024) for Medication Therapy Management.    SUBJECTIVE/OBJECTIVE:                          Sofy Stringer is a 24 year old female seen for a follow-up visit.       Reason for visit: med review.    Allergies/ADRs: Reviewed in chart  Past Medical History: Reviewed in chart  Tobacco: She reports that she has never smoked. She has never used smokeless tobacco.  Alcohol: once every six months    Medication Adherence/Access:   Patient uses pill box(es).  Patient takes medications 3 time(s) per day.   Misses levothyroxine on the weekends when doesn't wake up at her normal time.  The patient fills medications at [place]: NO, fills medications at [facility].    Weight Management   Semaglutide 2 mg weekly x 4 weeks - getting from [place] [facility], drawing up to 40 sonya on syringe.  Topiramate 50 mg at bedtime (started for migraines)    Denies side effects, interested in increasing dose again.  Her mom takes semaglutide and it seems to be helping her.   Denies side effects.    Medication History:  Saxenda: 3 mg - not effective for weight loss    BMI: 35.31 as of 11/1/23.  Starting Weight: 198 lb  Current weight: 185 lb    Wt Readings from Last 5 Encounters:   07/10/24 195 lb (88.5 kg)   02/06/24 191 lb 6 oz (86.8 kg)   01/11/24 188  lb (85.3 kg)   11/01/23 187 lb 6.4 oz (85 kg)   06/27/23 183 lb (83 kg)     Hypothyroidism:   Levothyroxine 25 mcg daily at 4:30 am, 1 hour to 1.5 hours before eating breakfast - has the option to take 50 mcg on the weekends, she notes Dr. Quarles has given her this option if her temperature regulation seems off - she hasn't needed to take two ever.    Her mom has been helping her remember the levothyroxine on the weekends and that's been going well.  Patient is having the following symptoms: hypothyroidism -  none  TSH   Date Value Ref Range Status   09/17/2024 3.92 0.30 - 4.20 uIU/mL Final   12/09/2022 7.32 (H) 0.40 - 4.00 mU/L Final   06/18/2021 2.17 0.40 - 4.00 mU/L Final     Migraine:   Preventive medications  Topiramate 50 mg once daily at bedtime   Acute medications  Sumatriptan 25 mg as needed  Excedrin or ibuprofen as needed - hasn't needed this lately    If she takes the topiramate 25 mg twice daily, it caused daytime sedation, she's comfortable at this dose of topiramate. Still getting migraines, they are better when she's at home, but the stress at work still gives her migraines.   Her mom also gets migraines.      Today's Vitals: There were no vitals taken for this visit.  ----------------      I spent 10 minutes with this patient today. All changes were made via collaborative practice agreement with Tom Quarles MD. A copy of the visit note was provided to the patient's provider(s).    A summary of these recommendations was sent via [employer].    Margot Newton, Edwardo  Medication Therapy Management Pharmacist    Telemedicine Visit Details  The patient's medications can be safely assessed via a telemedicine encounter.  Type of service:  Telephone visit  Originating Location (pt. Location): Home    Distant Location (provider location):  On-site  Start Time: 3:37 PM  End Time: 3:47 PM     Medication Therapy Recommendations  Obesity    Current Medication: COMPOUNDED NON-CONTROLLED SUBSTANCE (CMPD RX) -  PHARMACY TO MIX COMPOUNDED MEDICATION   Rationale: Dose too low - Dosage too low - Effectiveness   Recommendation: Increase Dose   Status: Accepted per CPA

## 2024-10-16 NOTE — Clinical Note
CAROL FARAH note, thanks!  Margot Newton, PharmD Medication Therapy Management Pharmacist [phone]

## 2024-11-13 ENCOUNTER — VIRTUAL VISIT (OUTPATIENT)
Dept: PHARMACY | Facility: CLINIC | Age: 24
End: 2024-11-13
Payer: COMMERCIAL

## 2024-11-13 DIAGNOSIS — E66.9 OBESITY: Primary | ICD-10-CM

## 2024-11-13 DIAGNOSIS — G43.909 MIGRAINE: ICD-10-CM

## 2024-11-13 DIAGNOSIS — E03.9 HYPOTHYROIDISM: ICD-10-CM

## 2024-11-13 NOTE — PROGRESS NOTES
Medication Therapy Management (MTM) Encounter    ASSESSMENT:                            Medication Adherence/Access: No issues identified.    Weight Management: May benefit from maintaining current therapy, can continue semaglutide 2.4 mg weekly up to three months, can consider reducing dose or stopping at any point if weight loss is excessive.      Hypothyroidism: TSH is within normal limits.     Migraine: I have no concerns with a possible change from sumatriptan to Relpax, she is due to follow-up with Dr. Quarles, will have her schedule with him and discuss.  Could also consider increasing topiramate dose, maximum of 200 mg at bedtime, current 50 mg does cause drowsiness however.     PLAN:                            Schedule follow-up with Dr. Quarles to discuss migraines, changing sumatriptan to Relpax.   Continue semaglutide 2.4 mg from [facility], [phone] for up to three months.    Follow-up: Return in about 3 months (around 2/13/2025) for Medication Therapy Management.    SUBJECTIVE/OBJECTIVE:                          Sofy Stringer is a 24 year old female seen for a follow-up visit.       Reason for visit: med review.    Allergies/ADRs: Reviewed in chart  Past Medical History: Reviewed in chart  Tobacco: She reports that she has never smoked. She has never used smokeless tobacco.  Alcohol: once every six months    Medication Adherence/Access:   Patient uses pill box(es).  Patient takes medications 3 time(s) per day.   Misses levothyroxine on the weekends when doesn't wake up at her normal time.  The patient fills medications at [place]: NO, fills medications at [facility].    Weight Management   Semaglutide 2.5 mg weekly x 4 weeks - getting from [facility],  Topiramate 50 mg at bedtime (started for migraines)    She's started to have some weight loss more consistently now which she's happy about.   Her mom takes semaglutide and it seems to be helping her.    Denies side effects.  Medication History:  Saxenda: 3 mg - not effective for weight loss    BMI: 35.31 as of 11/1/23.  Starting Weight: 198 lb  Current weight: 179 lb    Wt Readings from Last 5 Encounters:   07/10/24 195 lb (88.5 kg)   02/06/24 191 lb 6 oz (86.8 kg)   01/11/24 188 lb (85.3 kg)   11/01/23 187 lb 6.4 oz (85 kg)   06/27/23 183 lb (83 kg)     Hypothyroidism:   Levothyroxine 25 mcg daily at 4:30 am, 1 hour to 1.5 hours before eating breakfast - has the option to take 50 mcg on the weekends, she notes Dr. Quarles has given her this option if her temperature regulation seems off - she hasn't needed to take two ever.    Her mom has been helping her remember the levothyroxine on the weekends and that's been going well.  Patient is having the following symptoms: hypothyroidism -  none  TSH   Date Value Ref Range Status   09/17/2024 3.92 0.30 - 4.20 uIU/mL Final   12/09/2022 7.32 (H) 0.40 - 4.00 mU/L Final   06/18/2021 2.17 0.40 - 4.00 mU/L Final     Migraine:   Preventive medications  Topiramate 50 mg once daily at bedtime   Acute medications  Sumatriptan 25 mg as needed  Excedrin or ibuprofen as needed - hasn't needed this lately    Relpax    If she takes the topiramate 25 mg twice daily, it caused daytime sedation, she's comfortable at this dose of topiramate. Still getting migraines, they are better when she's at home, but the stress at work still gives her migraines.  The sumatriptan isn't doing it right now, interested in starting Relpax instead, that works for he mom.  Her mom also gets migraines.      Today's Vitals: There were no vitals taken for this visit.  ----------------      I spent 20 minutes with this patient today. All changes were made via collaborative practice agreement with Tom Quarles MD. A copy of the visit note was provided to the patient's provider(s).    A summary of these recommendations was sent via [employer].    Margot Rust, PharmD  Medication Therapy Management  Pharmacist      Telemedicine Visit Details  The patient's medications can be safely assessed via a telemedicine encounter.  Type of service:  Telephone visit  Originating Location (pt. Location): Home    Distant Location (provider location):  Off-site  Start Time: 3:30 PM  End Time: 3:40 PM     Medication Therapy Recommendations  No medication therapy recommendations to display

## 2024-11-13 NOTE — PATIENT INSTRUCTIONS
"Recommendations from today's MTM visit:                                                       Schedule follow-up with Dr. Quarles to discuss migraines, changing sumatriptan to Relpax.   Continue semaglutide 2.4 mg from [facility], [phone] for up to three months.    Follow-up: Return in about 3 months (around 2/13/2025) for Medication Therapy Management.    It was great speaking with you today.  I value your experience and would be very thankful for your time in providing feedback in our clinic survey. In the next few days, you may receive an email or text message from [email or web address] with a link to a survey related to your  clinical pharmacist.\"     To schedule another MTM appointment, please call the clinic directly or you may call the MTM scheduling line at [phone] or toll-free at [phone].     My Clinical Pharmacist's contact information:                                                      Please feel free to contact me with any questions or concerns you have.      Margot Newton, PharmD  Medication Therapy Management Pharmacist     "

## 2024-11-13 NOTE — Clinical Note
CAROL FARAH note, thanks!  Margot Newton, PharmD Medication Therapy Management Pharmacist [phone]

## 2025-01-04 ENCOUNTER — HEALTH MAINTENANCE LETTER (OUTPATIENT)
Age: 25
End: 2025-01-04

## 2025-01-21 ENCOUNTER — PATIENT OUTREACH (OUTPATIENT)
Dept: CARE COORDINATION | Facility: CLINIC | Age: 25
End: 2025-01-21

## 2025-02-12 ENCOUNTER — VIRTUAL VISIT (OUTPATIENT)
Dept: PHARMACY | Facility: CLINIC | Age: 25
End: 2025-02-12
Payer: COMMERCIAL

## 2025-02-12 DIAGNOSIS — E03.9 HYPOTHYROIDISM: ICD-10-CM

## 2025-02-12 DIAGNOSIS — Z78.9 TAKES DIETARY SUPPLEMENTS: ICD-10-CM

## 2025-02-12 DIAGNOSIS — E66.9 OBESITY: Primary | ICD-10-CM

## 2025-02-12 DIAGNOSIS — G43.909 MIGRAINE: ICD-10-CM

## 2025-02-12 PROCEDURE — 99605 MTMS BY PHARM NP 15 MIN: CPT | Mod: 93 | Performed by: PHARMACIST

## 2025-02-12 NOTE — PROGRESS NOTES
Medication Therapy Management (MTM) Encounter    ASSESSMENT:                            Medication Adherence/Access: No issues identified.    Weight Management: May benefit from maintaining current therapy. Did discuss other oral options including:  Phentermine- with history of elevated HR with stimulants for ADHD in the past, would caution use, would need to be monitored for this  Phentermine/topiramate - as above regarding phentermine, avoid topiramate due to brain fog  Naltrexone/bupropion - she's cautious of this due to history of being sensitive to mental health medications.    She plans to discuss options further with Dr. Quarles.    Hypothyroidism: TSH is within normal limits.     Migraine: I have no concerns with a possible change from sumatriptan to Relpax, she is due to follow-up with Dr. Quarles, will have her schedule with him and discuss.      Alternative preventative migraine options include:  Amitriptyline  Gabapentin  Venlafaxine  Metoprolol  Propranolol  Or newer options as well through neurology.     Supplements Stable.    Heavy menstruation:   Stable.    Allergies:  Stable.    PLAN:                            Continue semaglutide 2.5 mg weekly.   Schedule follow-up with Dr. Quarles to discuss migraines, weight loss options other than semaglutide.    Follow-up: Return in about 2 months (around 4/12/2025) for Medication Therapy Management.    SUBJECTIVE/OBJECTIVE:                          Sofy Stringer is a 25 year old female seen for a follow-up visit.       Reason for visit: med review.    Allergies/ADRs: Reviewed in chart  Past Medical History: Reviewed in chart  Tobacco: She reports that she has never smoked. She has never used smokeless tobacco.  Alcohol: once every six months    Medication Adherence/Access:   Patient uses pill box(es).  Patient takes medications 3 time(s) per day.   Misses levothyroxine on the weekends when doesn't wake up at her normal time.  The patient fills medications at  [facility]: NO, fills medications at [facility].    Weight Management   Semaglutide 2.5 mg weekly getting from [facility],    Her weight has maintained the last month. She wonders about other oral options - she notes a history of elevated HR with stimulants for ADHD in the past (brought HR up to 140s).  Denies side effects.  Medication History:  Saxenda: 3 mg - not effective for weight loss    BMI: 35.31 as of 11/1/23.  Starting Weight: 198 lb  Current weight: 177.5 lb    Hypothyroidism:   Levothyroxine 25 mcg daily at 4:30 am, 1 hour to 1.5 hours before eating breakfast - has the option to take 50 mcg on the weekends, she notes Dr. Quarles has given her this option if her temperature regulation seems off in the summertime - she hasn't needed to take two ever so far.    Her mom has been helping her remember the levothyroxine on the weekends and that's been going well.  Patient is having the following symptoms: hypothyroidism -  none    Migraine:   Preventive medications  none  Acute medications  Sumatriptan 25 mg as needed  Excedrin or ibuprofen as needed - hasn't needed this lately    She stopped topiramate a month ago due to brain fog. She wonders about other options - she prefers to stay away from anything that could affect mental health as she is sensitive to these types of medications.   Her mom also gets migraines, Relpax works for her.  Medication History:  Topiramate caused brain fog    Supplements  Multivitamin daily   Ferrous sulfate daily   Vitamin C 1000 mg daily    No reported issues at this time.     Heavy menstruation:     Apri - she's off of this currently, planning to restart.    States this/these are effective. Denies side effects.       Allergies:    Cetirizine 10 mg daily     States this/these are effective. Denies side effects.       Today's Vitals: There were no vitals taken for this visit.  ----------------      I spent 20 minutes with this patient today. All changes  were made via collaborative practice agreement with Tom Quarles MD.     A summary of these recommendations was sent via [employer].    Rina HendersonD  Medication Therapy Management Pharmacist      Telemedicine Visit Details  The patient's medications can be safely assessed via a telemedicine encounter.  Type of service:  Telephone visit  Originating Location (pt. Location): Home    Distant Location (provider location):  On-site  Start Time: 3:33 PM  End Time: 3:53 PM     Medication Therapy Recommendations  No medication therapy recommendations to display

## 2025-02-12 NOTE — PATIENT INSTRUCTIONS
"Recommendations from today's MTM visit:                                                         Continue semaglutide 2.5 mg weekly.   Schedule follow-up with Dr. Quarles to discuss migraines, weight loss options other than semaglutide.    Follow-up: Return in about 2 months (around 4/12/2025) for Medication Therapy Management.    It was great speaking with you today.  I value your experience and would be very thankful for your time in providing feedback in our clinic survey. In the next few days, you may receive an email or text message from [employer] with a link to a survey related to your  clinical pharmacist.\"     To schedule another MTM appointment, please call the clinic directly or you may call the MTM scheduling line at [phone] or toll-free at [phone].     My Clinical Pharmacist's contact information:                                                      Please feel free to contact me with any questions or concerns you have.      Margot Newton, PharmD  Medication Therapy Management Pharmacist   "

## 2025-02-12 NOTE — Clinical Note
CAROL FARAH note, thanks!  Margot Newton, PharmD Medication Therapy Management Pharmacist [phone]

## 2025-04-15 ENCOUNTER — OFFICE VISIT (OUTPATIENT)
Dept: FAMILY MEDICINE | Facility: CLINIC | Age: 25
End: 2025-04-15
Payer: COMMERCIAL

## 2025-04-15 VITALS
HEART RATE: 96 BPM | SYSTOLIC BLOOD PRESSURE: 123 MMHG | DIASTOLIC BLOOD PRESSURE: 78 MMHG | OXYGEN SATURATION: 97 % | WEIGHT: 177 LBS | TEMPERATURE: 97.2 F | HEIGHT: 61 IN | RESPIRATION RATE: 16 BRPM | BODY MASS INDEX: 33.42 KG/M2

## 2025-04-15 DIAGNOSIS — D64.9 ANEMIA, UNSPECIFIED TYPE: ICD-10-CM

## 2025-04-15 DIAGNOSIS — G43.809 OTHER MIGRAINE WITHOUT STATUS MIGRAINOSUS, NOT INTRACTABLE: ICD-10-CM

## 2025-04-15 DIAGNOSIS — E03.9 HYPOTHYROIDISM, UNSPECIFIED TYPE: ICD-10-CM

## 2025-04-15 LAB
ERYTHROCYTE [DISTWIDTH] IN BLOOD BY AUTOMATED COUNT: 14.3 % (ref 10–15)
HCT VFR BLD AUTO: 34.7 % (ref 35–47)
HGB BLD-MCNC: 11 G/DL (ref 11.7–15.7)
MCH RBC QN AUTO: 26.8 PG (ref 26.5–33)
MCHC RBC AUTO-ENTMCNC: 31.7 G/DL (ref 31.5–36.5)
MCV RBC AUTO: 85 FL (ref 78–100)
PLATELET # BLD AUTO: 367 10E3/UL (ref 150–450)
RBC # BLD AUTO: 4.1 10E6/UL (ref 3.8–5.2)
WBC # BLD AUTO: 8.1 10E3/UL (ref 4–11)

## 2025-04-15 PROCEDURE — 84443 ASSAY THYROID STIM HORMONE: CPT | Performed by: FAMILY MEDICINE

## 2025-04-15 PROCEDURE — 3078F DIAST BP <80 MM HG: CPT | Performed by: FAMILY MEDICINE

## 2025-04-15 PROCEDURE — 82728 ASSAY OF FERRITIN: CPT | Performed by: FAMILY MEDICINE

## 2025-04-15 PROCEDURE — 3074F SYST BP LT 130 MM HG: CPT | Performed by: FAMILY MEDICINE

## 2025-04-15 PROCEDURE — 1126F AMNT PAIN NOTED NONE PRSNT: CPT | Performed by: FAMILY MEDICINE

## 2025-04-15 PROCEDURE — 85027 COMPLETE CBC AUTOMATED: CPT | Performed by: FAMILY MEDICINE

## 2025-04-15 PROCEDURE — 84439 ASSAY OF FREE THYROXINE: CPT | Performed by: FAMILY MEDICINE

## 2025-04-15 PROCEDURE — 99214 OFFICE O/P EST MOD 30 MIN: CPT | Performed by: FAMILY MEDICINE

## 2025-04-15 PROCEDURE — 80053 COMPREHEN METABOLIC PANEL: CPT | Performed by: FAMILY MEDICINE

## 2025-04-15 PROCEDURE — 36415 COLL VENOUS BLD VENIPUNCTURE: CPT | Performed by: FAMILY MEDICINE

## 2025-04-15 RX ORDER — SUMATRIPTAN SUCCINATE 25 MG/1
25 TABLET ORAL
Qty: 9 TABLET | Refills: 4 | Status: SHIPPED | OUTPATIENT
Start: 2025-04-15

## 2025-04-15 RX ORDER — FERROUS SULFATE 325(65) MG
325 TABLET ORAL
Qty: 90 TABLET | Refills: 3 | Status: SHIPPED | OUTPATIENT
Start: 2025-04-15 | End: 2025-04-16

## 2025-04-15 RX ORDER — PROPRANOLOL HYDROCHLORIDE 60 MG/1
60 CAPSULE, EXTENDED RELEASE ORAL DAILY
Qty: 90 CAPSULE | Refills: 3 | Status: SHIPPED | OUTPATIENT
Start: 2025-04-15

## 2025-04-15 RX ORDER — LEVOTHYROXINE SODIUM 25 UG/1
TABLET ORAL
Qty: 108 TABLET | Refills: 3 | Status: SHIPPED | OUTPATIENT
Start: 2025-04-15 | End: 2025-04-16

## 2025-04-15 ASSESSMENT — ENCOUNTER SYMPTOMS: HEADACHES: 1

## 2025-04-15 ASSESSMENT — PAIN SCALES - GENERAL: PAINLEVEL_OUTOF10: NO PAIN (0)

## 2025-04-15 NOTE — PROGRESS NOTES
ASSESSMENT / PLAN:  (E03.9) Hypothyroidism, unspecified type  Comment: stable clinically.   Plan: levothyroxine (SYNTHROID/LEVOTHROID) 25 MCG         tablet, TSH with free T4 reflex        Await labs. Continue meds.     (G43.809) Other migraine without status migrainosus, not intractable  Comment: needs help  Plan: SUMAtriptan (IMITREX) 25 MG tablet, propranolol        ER (INDERAL LA) 60 MG 24 hr capsule, Adult         Neurology  Referral        Patient interested in seeing neurology.  Add inderal. Reveiwed risks and side effects of medication  Work on deep breathing and better hydration. Call/email with questions/concerns  Consider elavil too.     (D64.9) Anemia, unspecified type  Comment: clinically ok  Plan: ferrous sulfate (FEROSUL) 325 (65 Fe) MG         tablet, CBC with platelets, Comprehensive         metabolic panel, Ferritin        Continue iron and observation. Follow-up ob/gyn for menses. Consider GI/hematology if worsening. Expected course and warning signs reviewed. Call/email with questions/concerns        Yazmin Goetz is a 25 year old, presenting for the following health issues:  Headache (Per pt  Sx more than usually , more severe.)      Follow-up anemia, obesity, hyperglycemia and hypothyroidism.   Patient seen ob/gyn  Emotionally doing ok. Work doing ok.   No currently dating  No too hot/cold. Sleep hit/miss. Over the counter sleep aides. Reading at bedtime. Emotionally doing ok.   Family doing ok. Exercise  some. One diet caffeine drink. No ALCOHOL.    Taking iron pills. Regular menses.  Worsening migraines. Imitrex x2-3/week and ibuprofen for more mild/moderate. Some nausea and light sensitive. No known foot allerges.   No changes with sleep.   Topamax - brain fog. Mom on propranolol.  No wellbutrin in past. No keto diet.       4/15/2025    11:31 AM   Additional Questions   Roomed by Chanel   Accompanied by Self         4/15/2025    11:31 AM   Patient Reported Additional  "Medications   Patient reports taking the following new medications n/a     Via the Health Maintenance questionnaire, the patient has reported the following services have been completed -Eye Exam: [facility] 2024-07-01, this information has been sent to the abstraction team.  Headache     History of Present Illness       Reason for visit:  Migraines - change in medications    She eats 2-3 servings of fruits and vegetables daily.She consumes 0 sweetened beverage(s) daily.She exercises with enough effort to increase her heart rate 20 to 29 minutes per day.  She exercises with enough effort to increase her heart rate 3 or less days per week.   She is taking medications regularly.              Objective    /78   Pulse 96   Temp 97.2  F (36.2  C) (Tympanic)   Resp 16   Ht 1.549 m (5' 1\")   Wt 80.3 kg (177 lb)   LMP 03/17/2025   SpO2 97%   BMI 33.44 kg/m    Body mass index is 33.44 kg/m .  Physical Exam   GENERAL: alert and no distress  EYES: Eyes grossly normal to inspection, PERRL and conjunctivae and sclerae normal  HENT: ear canals and TM's normal, nose and mouth without ulcers or lesions  NECK: no adenopathy, no asymmetry, masses, or scars  RESP: lungs clear to auscultation - no rales, rhonchi or wheezes  CV: regular rate and rhythm, normal S1 S2, no S3 or S4, no murmur, click or rub, no peripheral edema   ABDOMEN: soft, nontender, no hepatosplenomegaly, no masses and bowel sounds normal  MS: no gross musculoskeletal defects noted, no edema  NEURO: Normal strength and tone, mentation intact and speech normal  PSYCH: mentation appears normal, affect normal/bright            Signed Electronically by: Tom Quarles MD    "

## 2025-04-16 LAB
ALBUMIN SERPL BCG-MCNC: 4.1 G/DL (ref 3.5–5.2)
ALP SERPL-CCNC: 91 U/L (ref 40–150)
ALT SERPL W P-5'-P-CCNC: 31 U/L (ref 0–50)
ANION GAP SERPL CALCULATED.3IONS-SCNC: 10 MMOL/L (ref 7–15)
AST SERPL W P-5'-P-CCNC: 27 U/L (ref 0–45)
BILIRUB SERPL-MCNC: 0.2 MG/DL
BUN SERPL-MCNC: 8.9 MG/DL (ref 6–20)
CALCIUM SERPL-MCNC: 9.2 MG/DL (ref 8.8–10.4)
CHLORIDE SERPL-SCNC: 106 MMOL/L (ref 98–107)
CREAT SERPL-MCNC: 0.69 MG/DL (ref 0.51–0.95)
EGFRCR SERPLBLD CKD-EPI 2021: >90 ML/MIN/1.73M2
FERRITIN SERPL-MCNC: 9 NG/ML (ref 6–175)
GLUCOSE SERPL-MCNC: 94 MG/DL (ref 70–99)
HCO3 SERPL-SCNC: 23 MMOL/L (ref 22–29)
POTASSIUM SERPL-SCNC: 4 MMOL/L (ref 3.4–5.3)
PROT SERPL-MCNC: 7.2 G/DL (ref 6.4–8.3)
SODIUM SERPL-SCNC: 139 MMOL/L (ref 135–145)
T4 FREE SERPL-MCNC: 0.68 NG/DL (ref 0.9–1.7)
TSH SERPL DL<=0.005 MIU/L-ACNC: 14 UIU/ML (ref 0.3–4.2)

## 2025-04-16 RX ORDER — FERROUS SULFATE 325(65) MG
325 TABLET ORAL 2 TIMES DAILY
Qty: 180 TABLET | Refills: 1 | Status: SHIPPED | OUTPATIENT
Start: 2025-04-16

## 2025-04-16 RX ORDER — LEVOTHYROXINE SODIUM 25 UG/1
TABLET ORAL
Qty: 132 TABLET | Refills: 3 | Status: SHIPPED | OUTPATIENT
Start: 2025-04-16

## 2025-04-30 ENCOUNTER — PATIENT OUTREACH (OUTPATIENT)
Dept: CARE COORDINATION | Facility: CLINIC | Age: 25
End: 2025-04-30
Payer: COMMERCIAL

## 2025-05-01 ENCOUNTER — OFFICE VISIT (OUTPATIENT)
Dept: FAMILY MEDICINE | Facility: CLINIC | Age: 25
End: 2025-05-01
Payer: COMMERCIAL

## 2025-05-01 VITALS
OXYGEN SATURATION: 98 % | RESPIRATION RATE: 16 BRPM | WEIGHT: 175 LBS | HEART RATE: 93 BPM | HEIGHT: 61 IN | TEMPERATURE: 97.5 F | BODY MASS INDEX: 33.04 KG/M2 | DIASTOLIC BLOOD PRESSURE: 77 MMHG | SYSTOLIC BLOOD PRESSURE: 111 MMHG

## 2025-05-01 DIAGNOSIS — L30.4 INTERTRIGO: Primary | ICD-10-CM

## 2025-05-01 RX ORDER — NYSTATIN 100000 U/G
CREAM TOPICAL 2 TIMES DAILY
Qty: 30 G | Refills: 0 | Status: SHIPPED | OUTPATIENT
Start: 2025-05-01

## 2025-05-01 ASSESSMENT — PAIN SCALES - GENERAL: PAINLEVEL_OUTOF10: NO PAIN (0)

## 2025-05-01 NOTE — PROGRESS NOTES
"  Assessment & Plan     Intertrigo  Advised   - nystatin (MYCOSTATIN) [id number] UNIT/GM external cream; Apply topically 2 times daily.    Wear cotton Undergarments  Follow up if not better 1 month    Subjective   Sofy is a 25 year old, presenting for the following health issues:  Vaginal Problem      5/1/2025     7:06 AM   Additional Questions   Roomed by Nuria     Vaginal Problem     History of Present Illness       Reason for visit:  Possible yeast infection  Symptom onset:  1-3 days ago  Symptoms include:  Irritation near the area of the leg that meets the vaginal area and itchiness  Symptom intensity:  Mild  Symptom progression:  Staying the same  Had these symptoms before:  No  What makes it worse:  No  What makes it better:  Hydrocortisone cream for itchiness   She is taking medications regularly.                Rest of the ROS is Negative except see above and Problem list [stable]        Objective    /77   Pulse 93   Temp 97.5  F (36.4  C) (Temporal)   Resp 16   Ht 1.546 m (5' 0.87\")   Wt 79.4 kg (175 lb)   LMP 03/17/2025   SpO2 98%   BMI 33.21 kg/m    Body mass index is 33.21 kg/m .  Physical Exam   GENERAL: alert and no distress  ABDOMEN: soft, nontender, no hepatosplenomegaly, no masses and bowel sounds normal  Left Groin -has mild erythema          Signed Electronically by: Vandana Aragon MD    "

## 2025-06-11 ENCOUNTER — MYC MEDICAL ADVICE (OUTPATIENT)
Dept: FAMILY MEDICINE | Facility: CLINIC | Age: 25
End: 2025-06-11
Payer: COMMERCIAL

## 2025-06-11 DIAGNOSIS — E03.9 HYPOTHYROIDISM, UNSPECIFIED TYPE: Primary | ICD-10-CM

## 2025-06-11 DIAGNOSIS — D64.9 ANEMIA, UNSPECIFIED TYPE: ICD-10-CM

## 2025-06-16 ENCOUNTER — LAB (OUTPATIENT)
Dept: LAB | Facility: CLINIC | Age: 25
End: 2025-06-16
Payer: COMMERCIAL

## 2025-06-16 DIAGNOSIS — D64.9 ANEMIA, UNSPECIFIED TYPE: ICD-10-CM

## 2025-06-16 DIAGNOSIS — E03.9 HYPOTHYROIDISM, UNSPECIFIED TYPE: ICD-10-CM

## 2025-06-16 LAB
FERRITIN SERPL-MCNC: 14 NG/ML (ref 6–175)
T3 SERPL-MCNC: 99 NG/DL (ref 85–202)
T4 FREE SERPL-MCNC: 0.99 NG/DL (ref 0.9–1.7)
TSH SERPL DL<=0.005 MIU/L-ACNC: 6.47 UIU/ML (ref 0.3–4.2)

## 2025-06-16 PROCEDURE — 36415 COLL VENOUS BLD VENIPUNCTURE: CPT

## 2025-06-16 PROCEDURE — 84480 ASSAY TRIIODOTHYRONINE (T3): CPT

## 2025-06-16 PROCEDURE — 86800 THYROGLOBULIN ANTIBODY: CPT

## 2025-06-16 PROCEDURE — 82728 ASSAY OF FERRITIN: CPT

## 2025-06-16 PROCEDURE — 84443 ASSAY THYROID STIM HORMONE: CPT

## 2025-06-16 PROCEDURE — 84439 ASSAY OF FREE THYROXINE: CPT

## 2025-06-17 ENCOUNTER — RESULTS FOLLOW-UP (OUTPATIENT)
Dept: FAMILY MEDICINE | Facility: CLINIC | Age: 25
End: 2025-06-17

## 2025-06-17 LAB — THYROGLOB AB SERPL IA-ACNC: <20 IU/ML

## 2025-08-05 ENCOUNTER — TELEPHONE (OUTPATIENT)
Dept: FAMILY MEDICINE | Facility: CLINIC | Age: 25
End: 2025-08-05
Payer: COMMERCIAL

## 2025-08-05 ENCOUNTER — MYC MEDICAL ADVICE (OUTPATIENT)
Dept: FAMILY MEDICINE | Facility: CLINIC | Age: 25
End: 2025-08-05
Payer: COMMERCIAL

## 2025-08-05 DIAGNOSIS — E03.9 HYPOTHYROIDISM, UNSPECIFIED TYPE: ICD-10-CM

## 2025-08-06 RX ORDER — LEVOTHYROXINE SODIUM 25 UG/1
TABLET ORAL
Qty: 180 TABLET | Refills: 1 | Status: SHIPPED | OUTPATIENT
Start: 2025-08-06

## 2025-08-07 ENCOUNTER — E-VISIT (OUTPATIENT)
Dept: FAMILY MEDICINE | Facility: CLINIC | Age: 25
End: 2025-08-07
Payer: COMMERCIAL

## 2025-08-07 DIAGNOSIS — G43.801 OTHER MIGRAINE WITH STATUS MIGRAINOSUS, NOT INTRACTABLE: Primary | ICD-10-CM

## 2025-08-11 ENCOUNTER — MYC MEDICAL ADVICE (OUTPATIENT)
Dept: FAMILY MEDICINE | Facility: CLINIC | Age: 25
End: 2025-08-11
Payer: COMMERCIAL

## 2025-09-02 ENCOUNTER — TELEPHONE (OUTPATIENT)
Dept: PHARMACY | Facility: CLINIC | Age: 25
End: 2025-09-02
Payer: COMMERCIAL